# Patient Record
Sex: MALE | Race: WHITE | Employment: FULL TIME | URBAN - METROPOLITAN AREA
[De-identification: names, ages, dates, MRNs, and addresses within clinical notes are randomized per-mention and may not be internally consistent; named-entity substitution may affect disease eponyms.]

---
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
## 2021-06-17 ENCOUNTER — HOSPITAL ENCOUNTER (EMERGENCY)
Age: 21
Discharge: HOME | End: 2021-06-17
Payer: MEDICAID

## 2021-06-17 ENCOUNTER — APPOINTMENT (OUTPATIENT)
Dept: GENERAL RADIOLOGY | Age: 21
End: 2021-06-17
Payer: MEDICAID

## 2021-06-17 VITALS
OXYGEN SATURATION: 100 % | RESPIRATION RATE: 16 BRPM | WEIGHT: 138 LBS | HEIGHT: 68 IN | TEMPERATURE: 98.2 F | HEART RATE: 72 BPM | SYSTOLIC BLOOD PRESSURE: 133 MMHG | DIASTOLIC BLOOD PRESSURE: 57 MMHG | BODY MASS INDEX: 20.92 KG/M2

## 2021-06-17 DIAGNOSIS — M79.641 RIGHT HAND PAIN: Primary | ICD-10-CM

## 2021-06-17 PROCEDURE — 99283 EMERGENCY DEPT VISIT LOW MDM: CPT

## 2021-06-17 PROCEDURE — 73110 X-RAY EXAM OF WRIST: CPT

## 2021-06-17 PROCEDURE — 73130 X-RAY EXAM OF HAND: CPT

## 2021-06-17 RX ORDER — NAPROXEN 500 MG/1
500 TABLET ORAL 2 TIMES DAILY PRN
Qty: 20 TABLET | Refills: 0 | Status: SHIPPED | OUTPATIENT
Start: 2021-06-17 | End: 2022-03-01

## 2021-06-17 ASSESSMENT — PAIN DESCRIPTION - PAIN TYPE: TYPE: ACUTE PAIN

## 2021-06-17 ASSESSMENT — PAIN SCALES - GENERAL: PAINLEVEL_OUTOF10: 2

## 2021-06-17 NOTE — ED PROVIDER NOTES
(MOBIC) 15 MG tablet Take 1 tablet by mouth daily 90 tablet 1    acetaminophen (AMINOFEN) 325 MG tablet Take 2 tablets by mouth every 6 hours as needed for Pain 20 tablet 0    ondansetron (ZOFRAN ODT) 4 MG disintegrating tablet Take 1 tablet by mouth every 8 hours as needed for Nausea 15 tablet 0    albuterol sulfate  (90 Base) MCG/ACT inhaler Inhale 2 puffs into the lungs 4 times daily as needed      budesonide-formoterol (SYMBICORT) 160-4.5 MCG/ACT AERO Inhale 2 puffs into the lungs 2 times daily      tiotropium (SPIRIVA RESPIMAT) 2.5 MCG/ACT AERS inhaler Inhale 2 puffs into the lungs 2 times daily      montelukast (SINGULAIR) 10 MG tablet Take 10 mg by mouth nightly      dupilumab (DUPIXENT) 300 MG/2ML SOSY injection Inject 300 mg into the skin every 14 days      fluticasone-salmeterol (ADVAIR DISKUS) 100-50 MCG/DOSE diskus inhaler Inhale 1 puff into the lungs every 12 hours      predniSONE (DELTASONE) 5 MG tablet Take 5 mg by mouth daily      albuterol (PROVENTIL) (2.5 MG/3ML) 0.083% nebulizer solution Take 3 mLs by nebulization every 4 hours as needed for Wheezing or Shortness of Breath (Cough) 120 each 3       ALLERGIES    Allergies   Allergen Reactions    Asmanex (120 Metered Doses) [Mometasone Furoate]      Patient reports this gives him hot flashes and dizziness.        SOCIAL & FAMILY HISTORY    Social History     Socioeconomic History    Marital status: Single     Spouse name: None    Number of children: None    Years of education: None    Highest education level: None   Occupational History    None   Tobacco Use    Smoking status: Never Smoker    Smokeless tobacco: Never Used   Substance and Sexual Activity    Alcohol use: Never    Drug use: Never    Sexual activity: None   Other Topics Concern    None   Social History Narrative    ** Merged History Encounter **          Social Determinants of Health     Financial Resource Strain:     Difficulty of Paying Living Expenses: no rash. skin intact  Vascular: affected extremity distally neurovascularly intact - sensation and capillary refill intact. Neurologic:  Alert and oriented. Appropriate thought pattern. Equal sensation over the bilateral deltoids and distally. No wrist drop. DTRs and distal sensation equal/intact. 5/5  strength of affected hand    Psychiatric: Cooperative, pleasant affect    RADIOLOGY/PROCEDURES       XR WRIST RIGHT (MIN 3 VIEWS) (Final result)  Result time 06/17/21 09:02:46  Final result by Panfilo Wylie MD (06/17/21 09:02:46)                Impression:    1. No acute abnormality involving the right wrist or hand. Narrative:    EXAMINATION:   3 XRAY VIEWS OF THE RIGHT WRIST; THREE XRAY VIEWS OF THE RIGHT HAND     6/17/2021 8:21 am     COMPARISON:   None. HISTORY:   ORDERING SYSTEM PROVIDED HISTORY: pain   TECHNOLOGIST PROVIDED HISTORY:   Reason for exam:->pain   Reason for Exam: pain     FINDINGS:   The bone mineralization is within normal limits.  The joint spaces appear   unremarkable.  No acute fractures or dislocations are seen. Iris Callander is no soft   tissue swelling. No bony erosions are seen.                     XR HAND RIGHT (MIN 3 VIEWS) (Final result)  Result time 06/17/21 09:02:46  Final result by Panfilo Wylie MD (06/17/21 09:02:46)                Impression:    1. No acute abnormality involving the right wrist or hand. Narrative:    EXAMINATION:   3 XRAY VIEWS OF THE RIGHT WRIST; THREE XRAY VIEWS OF THE RIGHT HAND     6/17/2021 8:21 am     COMPARISON:   None. HISTORY:   ORDERING SYSTEM PROVIDED HISTORY: pain   TECHNOLOGIST PROVIDED HISTORY:   Reason for exam:->pain   Reason for Exam: pain     FINDINGS:   The bone mineralization is within normal limits.  The joint spaces appear   unremarkable.  No acute fractures or dislocations are seen. Iris Callander is no soft   tissue swelling.  No bony erosions are seen.                         ED COURSE & MEDICAL DECISION MAKING Vital signs and nursing notes reviewed during ED course. I have independently evaluated this patient . Supervising MD - Dr. Bala Cardenas - present in the Emergency Department, available for consultation, throughout entirety of  patient care. All pertinent Lab data and radiographic results reviewed with patient at bedside. The patient and/or the family were informed of the results of any tests/labs/imaging, the treatment plan, and time was allotted to answer questions. Differential diagnosis: includes but not limited to ligamentous injury, tendon injury, soft tissue contusion/hematoma, fracture, dislocation, Infection, Neurologic Deficit/Injury. Clinical  IMPRESSION    1. Right hand pain        Patient presents with right thumb and third digit pain after hyperextension injury. Work-up was initiated triage. On exam, no gross bony deformities of the affected digits or hand. Tenderness palpation at the MTP joints of the affected digits without palpable bony or soft tissue defects. Full range of motion of IP and MTP joints with 5/5 strength. Full thumb opposition. No scaphoid tenderness. Patient is neurovascular intact distally with soft compartments throughout. X-rays of the right hand and wrist reveal no evidence of acute osseous abnormality, fracture or subluxation. Given mechanism of injury, suspect acute finger strain versus sprain injury. We discussed consider symptomatic management outpatient follow-up with family doctor as well as orthopedics as cannot completely rule out occult osseous abnormality. As patient is claiming Worker's Comp., will be given outpatient follow-up with occupational health. Low clinical suspicion for ischemic limb, compartment syndrome, intra-articular joint infection/septic arthritis, DVT, osteomyelitis, arterial/neurologic injury, necrotizing fasciitis, or infected/rapidly expanding hematoma. Patient is discharged in stable condition.  Educated on [address] therapy. May continue to be as tolerated. Given naproxen for pain. . Patient is instructed to followup with orthopedics in 7-10 days, sooner with worsened symptoms. Return precautions back to the ED discussed for any new or worsening symptoms. Patient was placed in an ulnar gutter splint today. Pain medication provided. Followup with orthopedist-information provided today. Diagnosis and plan discussed in detail with patient who understands and agrees. Patient agrees to return emergency department if symptoms worsen or any new symptoms develop. Comment: Please note this report has been produced using speech recognition software and may contain errors related to that system including errors in grammar, punctuation, and spelling, as well as words and phrases that may be inappropriate. If there are any questions or concerns please feel free to contact the dictating provider for clarification.           Aaron Mcdaniel PA-C  06/17/21 2750

## 2021-06-17 NOTE — LETTER
[facility] Emergency Department  [address] [facility] [address]  Phone: [phone]  Fax: [phone]             June 17, 2021    Patient: Dayana Brown   YOB: 2000   Date of Visit: 6/17/2021       To Whom It May Concern:    Kermit Cazares was seen and treated in our emergency department on 6/17/2021. He may return to work on 6/18/2021.       Sincerely,     nurse        Signature:__________________________________

## 2021-07-29 ENCOUNTER — OFFICE VISIT (OUTPATIENT)
Dept: CARDIOLOGY CLINIC | Age: 21
End: 2021-07-29
Payer: MEDICAID

## 2021-07-29 ENCOUNTER — NURSE ONLY (OUTPATIENT)
Dept: CARDIOLOGY CLINIC | Age: 21
End: 2021-07-29
Payer: MEDICAID

## 2021-07-29 VITALS
BODY MASS INDEX: 21.31 KG/M2 | DIASTOLIC BLOOD PRESSURE: 74 MMHG | SYSTOLIC BLOOD PRESSURE: 118 MMHG | HEIGHT: 67 IN | WEIGHT: 135.8 LBS | HEART RATE: 55 BPM

## 2021-07-29 DIAGNOSIS — R00.1 BRADYCARDIA: ICD-10-CM

## 2021-07-29 DIAGNOSIS — I10 ESSENTIAL HYPERTENSION: ICD-10-CM

## 2021-07-29 DIAGNOSIS — R00.1 BRADYCARDIA: Primary | ICD-10-CM

## 2021-07-29 DIAGNOSIS — I10 ESSENTIAL HYPERTENSION: Primary | ICD-10-CM

## 2021-07-29 PROCEDURE — G8420 CALC BMI NORM PARAMETERS: HCPCS | Performed by: INTERNAL MEDICINE

## 2021-07-29 PROCEDURE — G8427 DOCREV CUR MEDS BY ELIG CLIN: HCPCS | Performed by: INTERNAL MEDICINE

## 2021-07-29 PROCEDURE — 99204 OFFICE O/P NEW MOD 45 MIN: CPT | Performed by: INTERNAL MEDICINE

## 2021-07-29 PROCEDURE — 93225 XTRNL ECG REC<48 HRS REC: CPT | Performed by: INTERNAL MEDICINE

## 2021-07-29 PROCEDURE — 1036F TOBACCO NON-USER: CPT | Performed by: INTERNAL MEDICINE

## 2021-07-29 PROCEDURE — 93000 ELECTROCARDIOGRAM COMPLETE: CPT | Performed by: INTERNAL MEDICINE

## 2021-07-29 NOTE — PROGRESS NOTES
48 hour holter monitor [email or web address] AM  for Bradycardia  Serial # [id number] . Instructed patient on monitor and proper use. Instructed on diary. When to remove and bring it back. Must leave the holter monitor on  without removing for the duration of time ordered. Answered all questions the patient had. Instructed patient to call [facility] at [phone] with any questions or concerns with the monitor.

## 2021-07-29 NOTE — PROGRESS NOTES
CARDIOLOGY NOTE      7/29/2021    RE: Kristofer Larsen  (2000)                               TO:  Dr. Ortega primary care provider on file. Felipe Lewis is a 22 y.o. male who was seen today for management of  HTN                                    HPI:                   The patient does not have cardiac complaints  But feels has variable heart rate, goes down to 30s , has no CP, has mild SOB. Kristofer Larsen has the following history recorded in care path: There are no problems to display for this patient.     Current Outpatient Medications   Medication Sig Dispense Refill    naproxen (NAPROSYN) 500 MG tablet Take 1 tablet by mouth 2 times daily as needed for Pain 20 tablet 0    acetaminophen (AMINOFEN) 325 MG tablet Take 2 tablets by mouth every 6 hours as needed for Pain 20 tablet 0    ondansetron (ZOFRAN ODT) 4 MG disintegrating tablet Take 1 tablet by mouth every 8 hours as needed for Nausea 15 tablet 0    albuterol sulfate  (90 Base) MCG/ACT inhaler Inhale 2 puffs into the lungs 4 times daily as needed      budesonide-formoterol (SYMBICORT) 160-4.5 MCG/ACT AERO Inhale 2 puffs into the lungs 2 times daily      tiotropium (SPIRIVA RESPIMAT) 2.5 MCG/ACT AERS inhaler Inhale 2 puffs into the lungs 2 times daily      dupilumab (DUPIXENT) 300 MG/2ML SOSY injection Inject 300 mg into the skin every 14 days      fluticasone-salmeterol (ADVAIR DISKUS) 100-50 MCG/DOSE diskus inhaler Inhale 1 puff into the lungs every 12 hours      predniSONE (DELTASONE) 5 MG tablet Take 5 mg by mouth daily      albuterol (PROVENTIL) (2.5 MG/3ML) 0.083% nebulizer solution Take 3 mLs by nebulization every 4 hours as needed for Wheezing or Shortness of Breath (Cough) 120 each 3    baclofen (LIORESAL) 10 MG tablet Take 1 tablet by mouth 3 times daily (Patient not taking: Reported on 7/29/2021) 20 tablet 0    meloxicam (MOBIC) 15 MG tablet Take 1 tablet by mouth daily (Patient not taking: Reported on 7/29/2021) 90 tablet 1    montelukast (SINGULAIR) 10 MG tablet Take 10 mg by mouth nightly (Patient not taking: Reported on 7/29/2021)       No current facility-administered medications for this visit. Allergies: Asmanex (120 metered doses) [mometasone furoate]  Past Medical History:   Diagnosis Date    Asthma      Past Surgical History:   Procedure Laterality Date    KNEE SURGERY Left 2019      As reviewed History reviewed. No pertinent family history. Social History     Tobacco Use    Smoking status: Never Smoker    Smokeless tobacco: Never Used   Substance Use Topics    Alcohol use: Never      Review of Systems:    Constitutional: Negative for diaphoresis and fatigue  Psychological:Negative for anxiety or depression  HENT: Negative for headaches, nasal congestion, sinus pain or vertigo  Eyes: Negative for visual disturbance. Endocrine: Negative for polydipsia/polyuria  Respiratory: Negative for shortness of breath  Cardiovascular: Negative for chest pain, dyspnea on exertion, claudication, edema, irregular heartbeat, murmur, palpitations or shortness of breath  Gastrointestinal: Negative for abdominal pain or heartburn  Genito-Urinary: Negative for urinary frequency/urgency  Musculoskeletal: Negative for muscle pain, muscular weakness, negative for pain in arm and leg or swelling in foot and leg  Neurological: Negative for dizziness, headaches, memory loss, numbness/tingling, visual changes, syncope  Dermatological: Negative for rash    Objective:    Vitals:    07/29/21 0814   BP: 118/74   Pulse: 55   Weight: 135 lb 12.8 oz (61.6 kg)   Height: 5' 7\" (1.702 m)     /74   Pulse 55   Ht 5' 7\" (1.702 m)   Wt 135 lb 12.8 oz (61.6 kg)   BMI 21.27 kg/m²     No flowsheet data found.      Wt Readings from Last 3 Encounters:   07/29/21 135 lb 12.8 oz (61.6 kg)   06/17/21 138 lb (62.6 kg) (22 %, Z= -0.79)*   09/05/20 137 lb (62.1 kg) (23 %, Z= -0.74)*     * Growth percentiles are based on CDC (Boys, 2-20 Years) data. Body mass index is 21.27 kg/m². GENERAL - Alert, oriented, pleasant, in no apparent distress. EYES: No jaundice, no conjunctival pallor. SKIN: It is warm & dry. No rashes. No Echhymosis    HEENT - No clinically significant abnormalities seen. Neck - Supple. No jugular venous distention noted. No carotid bruits. Cardiovascular - Normal S1 and S2 without obvious murmur or gallop. Extremities - No cyanosis, clubbing, or significant edema. Pulmonary - No respiratory distress. No wheezes or rales. Abdomen - No masses, tenderness, or organomegaly. Musculoskeletal - No significant edema. No joint deformities. No muscle wasting. Neurologic - Cranial nerves II through XII are grossly intact. There were no gross focal neurologic abnormalities.     Lab Review   Lab Results   Component Value Date    TROPONINT <0.010 12/03/2019     BNP:  No results found for: BNP  PT/INR:  No results found for: INR  No results found for: LABA1C  Lab Results   Component Value Date    WBC 5.7 08/15/2020    HCT 46.7 08/15/2020    MCV 88.4 08/15/2020     08/15/2020     No results found for: CHOL, TRIG, HDL, LDLCALC, LDLDIRECT, CHOLHDLRATIO  Lab Results   Component Value Date    ALT 24 08/15/2020    AST 19 08/15/2020     BMP:    Lab Results   Component Value Date     08/15/2020    K 4.2 08/15/2020    CL 99 08/15/2020    CO2 26 08/15/2020    BUN 7 08/15/2020    CREATININE 0.8 08/15/2020     CMP:   Lab Results   Component Value Date     08/15/2020    K 4.2 08/15/2020    CL 99 08/15/2020    CO2 26 08/15/2020    BUN 7 08/15/2020    PROT 7.5 08/15/2020     TSH:  No results found for: TSH, TSHHS        Assessment & Plan:    - sohail: holter  - check  ETT for HR and BP        Pavan Talamantes MD    [facility] [place]

## 2021-07-29 NOTE — PATIENT INSTRUCTIONS
**It is YOUR responsibilty to bring medication bottles and/or updated medication list to [address] [place] [address]. This will allow us to better serve you and all your healthcare needs**      Please be informed that if you contact our office outside of normal business hours the physician on call cannot help with any scheduling or rescheduling issues, procedure instruction questions or any type of medication issue. We advise you for any urgent/emergency that you go to the nearest emergency room!     PLEASE CALL OUR OFFICE DURING NORMAL BUSINESS HOURS    Monday - Friday   8 am to 5 pm    [place]: Rachelle 12: [phone]    [place]:  [phone]'

## 2021-08-02 ENCOUNTER — PROCEDURE VISIT (OUTPATIENT)
Dept: CARDIOLOGY CLINIC | Age: 21
End: 2021-08-02
Payer: MEDICAID

## 2021-08-02 DIAGNOSIS — R00.1 BRADYCARDIA: ICD-10-CM

## 2021-08-02 DIAGNOSIS — I10 ESSENTIAL HYPERTENSION: ICD-10-CM

## 2021-08-02 PROCEDURE — 93015 CV STRESS TEST SUPVJ I&R: CPT | Performed by: INTERNAL MEDICINE

## 2021-08-02 NOTE — LETTER
[facility] [place] [facility]  [address] [address]  Phone: [phone]  Fax: [phone]    Gavin Leroy MD, [facility] [place]        August 2, 2021     Patient: Paddy Ramsay   YOB: 2000   Date of Visit: 8/2/2021       To Whom It May Concern:    Maryhelen Bosworth was in my office today for cardiac testing. If you have any questions or concerns, please don't hesitate to call.     Sincerely,        Gavin Leroy MD, [facility] [place]  AM/cjs

## 2021-08-02 NOTE — LETTER
[address] [place] [facility]  [address] [place] [address] [address]  Phone: [phone]  Fax: [phone]    Keli Simmons MD, [facility]      August 2, 2021     Patient: Malcolm Lemos   YOB: 2000   Date of Visit: 8/2/2021       To Whom It May Concern: It is my medical opinion that Alia Parry was in my office today for cardiac testing. If you have any questions or concerns, please don't hesitate to call.     Sincerely,        Keli Simmons MD, [facility]  AM/bandars

## 2021-08-17 PROCEDURE — 93227 XTRNL ECG REC<48 HR R&I: CPT | Performed by: INTERNAL MEDICINE

## 2021-08-18 ENCOUNTER — APPOINTMENT (OUTPATIENT)
Dept: GENERAL RADIOLOGY | Age: 21
End: 2021-08-18
Payer: MEDICAID

## 2021-08-18 ENCOUNTER — TELEPHONE (OUTPATIENT)
Dept: CARDIOLOGY CLINIC | Age: 21
End: 2021-08-18

## 2021-08-18 ENCOUNTER — HOSPITAL ENCOUNTER (EMERGENCY)
Age: 21
Discharge: HOME | End: 2021-08-18
Payer: MEDICAID

## 2021-08-18 VITALS
TEMPERATURE: 98.1 F | HEART RATE: 54 BPM | SYSTOLIC BLOOD PRESSURE: 114 MMHG | OXYGEN SATURATION: 100 % | DIASTOLIC BLOOD PRESSURE: 70 MMHG | RESPIRATION RATE: 17 BRPM | HEIGHT: 67 IN | WEIGHT: 135 LBS | BODY MASS INDEX: 21.19 KG/M2

## 2021-08-18 DIAGNOSIS — R07.89 OTHER CHEST PAIN: Primary | ICD-10-CM

## 2021-08-18 LAB
ALBUMIN SERPL-MCNC: 4.4 GM/DL (ref 3.4–5)
ALP BLD-CCNC: 82 IU/L (ref 40–128)
ALT SERPL-CCNC: 18 U/L (ref 10–40)
ANION GAP SERPL CALCULATED.3IONS-SCNC: 7 MMOL/L (ref 4–16)
AST SERPL-CCNC: 19 IU/L (ref 15–37)
BASOPHILS ABSOLUTE: 0.1 K/CU MM
BASOPHILS RELATIVE PERCENT: 1.1 % (ref 0–1)
BILIRUB SERPL-MCNC: 0.5 MG/DL (ref 0–1)
BUN BLDV-MCNC: 9 MG/DL (ref 6–23)
CALCIUM SERPL-MCNC: 9.7 MG/DL (ref 8.3–10.6)
CHLORIDE BLD-SCNC: 104 MMOL/L (ref 99–110)
CO2: 27 MMOL/L (ref 21–32)
CREAT SERPL-MCNC: 0.8 MG/DL (ref 0.9–1.3)
DIFFERENTIAL TYPE: ABNORMAL
EOSINOPHILS ABSOLUTE: 0.4 K/CU MM
EOSINOPHILS RELATIVE PERCENT: 6.7 % (ref 0–3)
GFR AFRICAN AMERICAN: >60 ML/MIN/1.73M2
GFR NON-AFRICAN AMERICAN: >60 ML/MIN/1.73M2
GLUCOSE BLD-MCNC: 93 MG/DL (ref 70–99)
HCT VFR BLD CALC: 46.8 % (ref 42–52)
HEMOGLOBIN: 14.6 GM/DL (ref 13.5–18)
IMMATURE NEUTROPHIL %: 0.4 % (ref 0–0.43)
LYMPHOCYTES ABSOLUTE: 2 K/CU MM
LYMPHOCYTES RELATIVE PERCENT: 37.4 % (ref 24–44)
MCH RBC QN AUTO: 28.9 PG (ref 27–31)
MCHC RBC AUTO-ENTMCNC: 31.2 % (ref 32–36)
MCV RBC AUTO: 92.5 FL (ref 78–100)
MONOCYTES ABSOLUTE: 0.6 K/CU MM
MONOCYTES RELATIVE PERCENT: 10.5 % (ref 0–4)
NUCLEATED RBC %: 0 %
PDW BLD-RTO: 13.2 % (ref 11.7–14.9)
PLATELET # BLD: 244 K/CU MM (ref 140–440)
PMV BLD AUTO: 11.2 FL (ref 7.5–11.1)
POTASSIUM SERPL-SCNC: 4.6 MMOL/L (ref 3.5–5.1)
RBC # BLD: 5.06 M/CU MM (ref 4.6–6.2)
SEGMENTED NEUTROPHILS ABSOLUTE COUNT: 2.3 K/CU MM
SEGMENTED NEUTROPHILS RELATIVE PERCENT: 43.9 % (ref 36–66)
SODIUM BLD-SCNC: 138 MMOL/L (ref 135–145)
TOTAL IMMATURE NEUTOROPHIL: 0.02 K/CU MM
TOTAL NUCLEATED RBC: 0 K/CU MM
TOTAL PROTEIN: 7.1 GM/DL (ref 6.4–8.2)
TROPONIN T: <0.01 NG/ML
TROPONIN T: <0.01 NG/ML
WBC # BLD: 5.2 K/CU MM (ref 4–10.5)

## 2021-08-18 PROCEDURE — 6370000000 HC RX 637 (ALT 250 FOR IP): Performed by: NURSE PRACTITIONER

## 2021-08-18 PROCEDURE — 93005 ELECTROCARDIOGRAM TRACING: CPT | Performed by: EMERGENCY MEDICINE

## 2021-08-18 PROCEDURE — 80053 COMPREHEN METABOLIC PANEL: CPT

## 2021-08-18 PROCEDURE — 36415 COLL VENOUS BLD VENIPUNCTURE: CPT

## 2021-08-18 PROCEDURE — 84484 ASSAY OF TROPONIN QUANT: CPT

## 2021-08-18 PROCEDURE — 99285 EMERGENCY DEPT VISIT HI MDM: CPT

## 2021-08-18 PROCEDURE — 71046 X-RAY EXAM CHEST 2 VIEWS: CPT

## 2021-08-18 PROCEDURE — 85025 COMPLETE CBC W/AUTO DIFF WBC: CPT

## 2021-08-18 RX ORDER — KETOROLAC TROMETHAMINE 30 MG/ML
15 INJECTION, SOLUTION INTRAMUSCULAR; INTRAVENOUS ONCE
Status: DISCONTINUED | OUTPATIENT
Start: 2021-08-18 | End: 2021-08-18 | Stop reason: HOSPADM

## 2021-08-18 RX ORDER — ASPIRIN 81 MG/1
324 TABLET, CHEWABLE ORAL ONCE
Status: COMPLETED | OUTPATIENT
Start: 2021-08-18 | End: 2021-08-18

## 2021-08-18 RX ADMIN — ASPIRIN 324 MG: 81 TABLET, CHEWABLE ORAL at 12:24

## 2021-08-18 ASSESSMENT — PAIN SCALES - GENERAL: PAINLEVEL_OUTOF10: 3

## 2021-08-18 ASSESSMENT — HEART SCORE: ECG: 1

## 2021-08-18 NOTE — ED PROVIDER NOTES
Twelve-lead EKG obtained at 0958 on 18 August 2021 and interpreted by me in the absence of a cardiologist.  There is nonspecific T wave inversion in lead II compared with prior. There is no criteria ST elevation or reciprocal change. There are no hyperacute T wave changes. There is no sign of acute ischemia or infarction. This tracing shows a sinus bradycardia with sinus arrhythmia. Rate and intervals are 53 beats per minute, MO interval 184 milliseconds, QRS duration 88 milliseconds, QTc interval 358 milliseconds, and R axis right shifted at 92 degrees. There is no acute change compared with the most recent EKG dated July 29, 2021.         Valentin Denis MD  08/18/21 1003

## 2021-08-18 NOTE — ED NOTES
Pt states he does not need pain medicine at this time and is comfortable.       Webster Snellen, TYESHA  08/18/21 9291

## 2021-08-18 NOTE — ED NOTES
[address] [facility] paged Dr Joe Hector covering Dr Leann Zimmerman  08/18/21 7912  1753 repaged Dr Cornelia Geronimo  08/18/21 [phone] Dr Joe Hector returned call      Yaquelin Hartman  08/18/21 1637

## 2021-08-18 NOTE — ED NOTES
Pt presents to ED from home for chest pain and dizziness that has been intermittent for about 3 months. Pt states that he has seen a cardiologist before.  Pt c/o pain in the lower left side of the chest      Amelia Miller RN  08/18/21 9216

## 2021-08-19 LAB
EKG ATRIAL RATE: 53 BPM
EKG DIAGNOSIS: NORMAL
EKG P AXIS: 57 DEGREES
EKG P-R INTERVAL: 184 MS
EKG Q-T INTERVAL: 382 MS
EKG QRS DURATION: 88 MS
EKG QTC CALCULATION (BAZETT): 358 MS
EKG R AXIS: 92 DEGREES
EKG T AXIS: 75 DEGREES
EKG VENTRICULAR RATE: 53 BPM

## 2021-08-19 PROCEDURE — 93010 ELECTROCARDIOGRAM REPORT: CPT | Performed by: INTERNAL MEDICINE

## 2021-08-20 ENCOUNTER — OFFICE VISIT (OUTPATIENT)
Dept: CARDIOLOGY CLINIC | Age: 21
End: 2021-08-20
Payer: MEDICAID

## 2021-08-20 VITALS
DIASTOLIC BLOOD PRESSURE: 74 MMHG | SYSTOLIC BLOOD PRESSURE: 110 MMHG | HEART RATE: 60 BPM | HEIGHT: 67 IN | OXYGEN SATURATION: 99 % | WEIGHT: 135 LBS | BODY MASS INDEX: 21.19 KG/M2

## 2021-08-20 DIAGNOSIS — R07.9 CHEST PAIN, UNSPECIFIED TYPE: Primary | ICD-10-CM

## 2021-08-20 PROCEDURE — G8420 CALC BMI NORM PARAMETERS: HCPCS | Performed by: INTERNAL MEDICINE

## 2021-08-20 PROCEDURE — G8427 DOCREV CUR MEDS BY ELIG CLIN: HCPCS | Performed by: INTERNAL MEDICINE

## 2021-08-20 PROCEDURE — 1036F TOBACCO NON-USER: CPT | Performed by: INTERNAL MEDICINE

## 2021-08-20 PROCEDURE — 99213 OFFICE O/P EST LOW 20 MIN: CPT | Performed by: INTERNAL MEDICINE

## 2021-08-20 PROCEDURE — 1111F DSCHRG MED/CURRENT MED MERGE: CPT | Performed by: INTERNAL MEDICINE

## 2021-08-20 NOTE — LETTER
Brenda Villarreal 29  2000  [id number]    Have you had any Chest Pain that is not new? - Yes  If Yes DO EKG - How does it feel - Sharp Pain and dull ache   How long does the pain last - a couple of seconds before recurring; during [address] visit, pain lasted all day   How long have you been having the pain - 2 years   Did you take a naproxen, aspirin   And did it relieve the pain - No      Have you had any Shortness of Breath - No    Have you had any dizziness - Yes  If Yes DO ORTHOSTATIC BP - when do you feel dizzy with position change   How long does it last - 1 minute or longer (depends)     Have you had any palpitations that are not new?  - No    No edema     Do you have a surgery or procedure scheduled in the near future - No

## 2021-08-20 NOTE — PATIENT INSTRUCTIONS
**It is YOUR responsibilty to bring medication bottles and/or updated medication list to [address] [place] [address]. This will allow us to better serve you and all your healthcare needs**    Please be informed that if you contact our office outside of normal business hours the physician on call cannot help with any scheduling or rescheduling issues, procedure instruction questions or any type of medication issue. We advise you for any urgent/emergency that you go to the nearest emergency room!     PLEASE CALL OUR OFFICE DURING NORMAL BUSINESS HOURS    Monday - Friday   8 am to 5 pm    [place]: [phone]    [place]:  [phone]

## 2021-08-20 NOTE — PROGRESS NOTES
CARDIOLOGY NOTE      8/20/2021    RE: Claudia Baltazar  (2000)                               TO:  Dr. Ortega primary care provider on file.             Zack Hare is a 22 y.o. male who was seen today for management of  HTN        Here for fu on stress and holter  And ED visit fu                            HPI:                   The patient does  have cardiac complaints of lt sided ch pain with mild SOB      Claudia Baltazar has the following history recorded in care path:  Patient Active Problem List    Diagnosis Date Noted    Bradycardia 07/29/2021     Current Outpatient Medications   Medication Sig Dispense Refill    naproxen (NAPROSYN) 500 MG tablet Take 1 tablet by mouth 2 times daily as needed for Pain 20 tablet 0    acetaminophen (AMINOFEN) 325 MG tablet Take 2 tablets by mouth every 6 hours as needed for Pain 20 tablet 0    ondansetron (ZOFRAN ODT) 4 MG disintegrating tablet Take 1 tablet by mouth every 8 hours as needed for Nausea 15 tablet 0    albuterol sulfate  (90 Base) MCG/ACT inhaler Inhale 2 puffs into the lungs 4 times daily as needed      budesonide-formoterol (SYMBICORT) 160-4.5 MCG/ACT AERO Inhale 2 puffs into the lungs 2 times daily      tiotropium (SPIRIVA RESPIMAT) 2.5 MCG/ACT AERS inhaler Inhale 2 puffs into the lungs 2 times daily      dupilumab (DUPIXENT) 300 MG/2ML SOSY injection Inject 300 mg into the skin every 14 days      predniSONE (DELTASONE) 5 MG tablet Take 5 mg by mouth daily      albuterol (PROVENTIL) (2.5 MG/3ML) 0.083% nebulizer solution Take 3 mLs by nebulization every 4 hours as needed for Wheezing or Shortness of Breath (Cough) 120 each 3    baclofen (LIORESAL) 10 MG tablet Take 1 tablet by mouth 3 times daily (Patient not taking: Reported on 7/29/2021) 20 tablet 0    meloxicam (MOBIC) 15 MG tablet Take 1 tablet by mouth daily (Patient not taking: Reported on 7/29/2021) 90 tablet 1    montelukast (SINGULAIR) 10 MG tablet Take 10 mg by mouth nightly (Patient not taking: Reported on 7/29/2021)      fluticasone-salmeterol (ADVAIR DISKUS) 100-50 MCG/DOSE diskus inhaler Inhale 1 puff into the lungs every 12 hours (Patient not taking: Reported on 8/20/2021)       No current facility-administered medications for this visit. Allergies: Albumen, egg and Asmanex (120 metered doses) [mometasone furoate]  Past Medical History:   Diagnosis Date    Asthma      Past Surgical History:   Procedure Laterality Date    KNEE SURGERY Left 2019      As reviewed History reviewed. No pertinent family history. Social History     Tobacco Use    Smoking status: Never Smoker    Smokeless tobacco: Never Used   Substance Use Topics    Alcohol use: Never     Comment: Caffeine: coffee (sometimes), energy drink (here and there), pop       Review of Systems:    Constitutional: Negative for diaphoresis and fatigue  Psychological:Negative for anxiety or depression  HENT: Negative for headaches, nasal congestion, sinus pain or vertigo  Eyes: Negative for visual disturbance. Endocrine: Negative for polydipsia/polyuria  Respiratory: Negative for shortness of breath  Cardiovascular:  CP  Gastrointestinal: Negative for abdominal pain or heartburn  Genito-Urinary: Negative for urinary frequency/urgency  Musculoskeletal: Negative for muscle pain, muscular weakness, negative for pain in arm and leg or swelling in foot and leg  Neurological: Negative for dizziness, headaches, memory loss, numbness/tingling, visual changes, syncope  Dermatological: Negative for rash    Objective:    Vitals:    08/20/21 1244   BP: 110/74   Site: Left Upper Arm   Position: Sitting   Cuff Size: Medium Adult   Pulse: 60   SpO2: 99%   Weight: 135 lb (61.2 kg)   Height: 5' 7\" (1.702 m)     /74 (Site: Left Upper Arm, Position: Sitting, Cuff Size: Medium Adult)   Pulse 60   Ht 5' 7\" (1.702 m)   Wt 135 lb (61.2 kg)   SpO2 99%   BMI 21.14 kg/m²     No flowsheet data found.      Wt Readings from Last 3 Encounters:   08/20/21 135 lb (61.2 kg)   08/18/21 135 lb (61.2 kg)   07/29/21 135 lb 12.8 oz (61.6 kg)     Body mass index is 21.14 kg/m². GENERAL - Alert, oriented, pleasant, in no apparent distress. EYES: No jaundice, no conjunctival pallor. SKIN: It is warm & dry. No rashes. No Echhymosis    HEENT - No clinically significant abnormalities seen. Neck - Supple. No jugular venous distention noted. No carotid bruits. Cardiovascular - Normal S1 and S2 without obvious murmur or gallop. Extremities - No cyanosis, clubbing, or significant edema. Pulmonary - No respiratory distress. No wheezes or rales. Abdomen - No masses, tenderness, or organomegaly. Musculoskeletal - No significant edema. No joint deformities. No muscle wasting. Neurologic - Cranial nerves II through XII are grossly intact. There were no gross focal neurologic abnormalities.     Lab Review   Lab Results   Component Value Date    TROPONINT <0.010 08/18/2021     BNP:  No results found for: BNP  PT/INR:  No results found for: INR  No results found for: LABA1C  Lab Results   Component Value Date    WBC 5.2 08/18/2021    HCT 46.8 08/18/2021    MCV 92.5 08/18/2021     08/18/2021     No results found for: CHOL, TRIG, HDL, LDLCALC, LDLDIRECT, CHOLHDLRATIO  Lab Results   Component Value Date    ALT 18 08/18/2021    AST 19 08/18/2021     BMP:    Lab Results   Component Value Date     08/18/2021    K 4.6 08/18/2021     08/18/2021    CO2 27 08/18/2021    BUN 9 08/18/2021    CREATININE 0.8 08/18/2021     CMP:   Lab Results   Component Value Date     08/18/2021    K 4.6 08/18/2021     08/18/2021    CO2 27 08/18/2021    BUN 9 08/18/2021    PROT 7.1 08/18/2021     TSH:  No results found for: TSH, TSHHS        Assessment & Plan:    - sohail: holter  Was normal  -Normal ETT  But cont to have CP, check CTA coronary      Alexander Davis MD    [facility] [place]

## 2021-08-20 NOTE — LETTER
[facility]  [address] [address]  Phone: [phone]  Fax: [phone]    Eloy Cervantes MD        August 20, 2021     Patient: Claudia Baltazar   YOB: 2000   Date of Visit: 8/20/2021       To Whom it May Concern:    Alex Kinney was seen in my clinic on 8/20/2021. If you have any questions or concerns, please don't hesitate to call.     Sincerely,         Eloy Cervantes MD

## 2021-08-30 ENCOUNTER — TELEPHONE (OUTPATIENT)
Dept: CARDIOLOGY CLINIC | Age: 21
End: 2021-08-30

## 2022-03-01 ENCOUNTER — OFFICE VISIT (OUTPATIENT)
Dept: CARDIOLOGY CLINIC | Age: 22
End: 2022-03-01
Payer: MEDICAID

## 2022-03-01 VITALS
WEIGHT: 131 LBS | BODY MASS INDEX: 20.56 KG/M2 | HEART RATE: 65 BPM | SYSTOLIC BLOOD PRESSURE: 112 MMHG | OXYGEN SATURATION: 99 % | HEIGHT: 67 IN | DIASTOLIC BLOOD PRESSURE: 64 MMHG

## 2022-03-01 DIAGNOSIS — I20.8 OTHER FORMS OF ANGINA PECTORIS (HCC): Primary | ICD-10-CM

## 2022-03-01 PROCEDURE — G8484 FLU IMMUNIZE NO ADMIN: HCPCS | Performed by: INTERNAL MEDICINE

## 2022-03-01 PROCEDURE — 99214 OFFICE O/P EST MOD 30 MIN: CPT | Performed by: INTERNAL MEDICINE

## 2022-03-01 PROCEDURE — G8420 CALC BMI NORM PARAMETERS: HCPCS | Performed by: INTERNAL MEDICINE

## 2022-03-01 PROCEDURE — 1036F TOBACCO NON-USER: CPT | Performed by: INTERNAL MEDICINE

## 2022-03-01 PROCEDURE — G8427 DOCREV CUR MEDS BY ELIG CLIN: HCPCS | Performed by: INTERNAL MEDICINE

## 2022-03-01 NOTE — PROGRESS NOTES
CARDIOLOGY NOTE      3/1/2022    RE: Becky Vo  (2000)                               TO:  Dr. Ortega primary care provider on file. Zhou Mcadams is a 22 y.o. male who was seen today for management of chest pain                                    HPI:                   Pt has h/o chest pain, seen today for follow-up. Pt has been having midsternal chest pain on and off nonexertional at times he was scheduled to get a CTA coronary however could not be performed given that his insurance is status is changed    [place] Tavo has the following history recorded in care path:  Patient Active Problem List    Diagnosis Date Noted    Bradycardia 07/29/2021     Current Outpatient Medications   Medication Sig Dispense Refill    naproxen (NAPROSYN) 500 MG tablet Take 1 tablet by mouth 2 times daily as needed for Pain 20 tablet 0    acetaminophen (AMINOFEN) 325 MG tablet Take 2 tablets by mouth every 6 hours as needed for Pain 20 tablet 0    ondansetron (ZOFRAN ODT) 4 MG disintegrating tablet Take 1 tablet by mouth every 8 hours as needed for Nausea 15 tablet 0    albuterol sulfate  (90 Base) MCG/ACT inhaler Inhale 2 puffs into the lungs 4 times daily as needed      budesonide-formoterol (SYMBICORT) 160-4.5 MCG/ACT AERO Inhale 2 puffs into the lungs 2 times daily      tiotropium (SPIRIVA RESPIMAT) 2.5 MCG/ACT AERS inhaler Inhale 2 puffs into the lungs 2 times daily      montelukast (SINGULAIR) 10 MG tablet Take 10 mg by mouth nightly       predniSONE (DELTASONE) 5 MG tablet Take 5 mg by mouth daily      albuterol (PROVENTIL) (2.5 MG/3ML) 0.083% nebulizer solution Take 3 mLs by nebulization every 4 hours as needed for Wheezing or Shortness of Breath (Cough) 120 each 3     No current facility-administered medications for this visit. Allergies:  Albumen, egg and Asmanex (120 metered doses) [mometasone furoate]  Past Medical History:   Diagnosis Date    Asthma      Past Surgical History:   Procedure Laterality Date    KNEE SURGERY Left 2019      As reviewed History reviewed. No pertinent family history. Social History     Tobacco Use    Smoking status: Never Smoker    Smokeless tobacco: Never Used   Substance Use Topics    Alcohol use: Never     Comment: Caffeine: coffee (sometimes), energy drink (here and there), pop         Objective:    Vitals:    03/01/22 0805 03/01/22 0812   BP: (!) 112/54 112/64   Site: Right Upper Arm Right Upper Arm   Position: Sitting Sitting   Cuff Size: Medium Adult Medium Adult   Pulse: 65    SpO2: 99%    Weight: 131 lb (59.4 kg)    Height: 5' 7\" (1.702 m)      /64 (Site: Right Upper Arm, Position: Sitting, Cuff Size: Medium Adult)   Pulse 65   Ht 5' 7\" (1.702 m)   Wt 131 lb (59.4 kg)   SpO2 99%   BMI 20.52 kg/m²     No flowsheet data found. Wt Readings from Last 3 Encounters:   03/01/22 131 lb (59.4 kg)   08/20/21 135 lb (61.2 kg)   08/18/21 135 lb (61.2 kg)     Body mass index is 20.52 kg/m². GENERAL - Alert, oriented, pleasant, in no apparent distress. EYES: No jaundice, no conjunctival pallor. SKIN: It is warm & dry. No rashes. No Echhymosis    HEENT - No clinically significant abnormalities seen. Neck - Supple. No jugular venous distention noted. No carotid bruits. Cardiovascular - Normal S1 and S2 without obvious murmur or gallop. Extremities - No cyanosis, clubbing, or significant edema. Pulmonary - No respiratory distress. No wheezes or rales. Abdomen - No masses, tenderness, or organomegaly. Musculoskeletal - No significant edema. No joint deformities. No muscle wasting. Neurologic - Cranial nerves II through XII are grossly intact. There were no gross focal neurologic abnormalities.     Lab Review   Lab Results   Component Value Date    TROPONINT <0.010 08/18/2021     BNP:  No results found for: BNP  PT/INR:  No results found for: INR  No results found for: LABA1C  Lab Results   Component Value Date WBC 5.2 08/18/2021    HCT 46.8 08/18/2021    MCV 92.5 08/18/2021     08/18/2021     No results found for: CHOL, TRIG, HDL, LDLCALC, LDLDIRECT, CHOLHDLRATIO  Lab Results   Component Value Date    ALT 18 08/18/2021    AST 19 08/18/2021     BMP:    Lab Results   Component Value Date     08/18/2021    K 4.6 08/18/2021     08/18/2021    CO2 27 08/18/2021    BUN 9 08/18/2021    CREATININE 0.8 08/18/2021     CMP:   Lab Results   Component Value Date     08/18/2021    K 4.6 08/18/2021     08/18/2021    CO2 27 08/18/2021    BUN 9 08/18/2021    PROT 7.1 08/18/2021     TSH:  No results found for: TSH, TSHHS        Assessment & Plan:    -Chest pain; cardiac CTA requested on the last visit is still pending  Still having symptoms hence will obtain a CTA coronary on him patient's insurance is changed hence we will try to get it done  Please note the patient regular treadmill stress test was normal however he continues to be symptomatic and has a very strong family history of coronary disease with his grandfather who is my patient has coronary artery disease and grandmother also     -Patient had a normal stress,  and Holter monitor    -Risk factor modification for this patient patient will continue        Jeff Douglas MD    [facility] [place]    Please note this report has been partially produced using speech recognition software and may contain errors related to that system including errors in grammar, punctuation, and spelling, as well as words and phrases that may be inappropriate. If there are any questions or concerns please feel free to contact the dictating provider for clarification.

## 2022-03-01 NOTE — LETTER
[facility]  [address] [place] [address]  Phone: [phone]  Fax: [phone]    Anam Huber MD        March 1, 2022     Patient: Lyudmila May   YOB: 2000   Date of Visit: 3/1/2022       To Whom It May Concern: It is my medical opinion that Marialuisa Gordon may return to full duty immediately with no restrictions. If you have any questions or concerns, please don't hesitate to call.     Sincerely,        Anam Huber MD

## 2022-03-01 NOTE — PATIENT INSTRUCTIONS
Please be informed that if you contact our office outside of normal business hours the physician on call cannot help with any scheduling or rescheduling issues, procedure instruction questions or any type of medication issue. We advise you for any urgent/emergency that you go to the nearest emergency room! PLEASE CALL OUR OFFICE DURING NORMAL BUSINESS HOURS    Monday - Friday   8 am to 5 pm    [place]: Rachelle 12: [phone]    [place]:  [phone]    **It is YOUR responsibilty to bring medication bottles and/or updated medication list to [address] [place] [address].  This will allow us to better serve you and all your healthcare needs**

## 2022-03-30 ENCOUNTER — HOSPITAL ENCOUNTER (OUTPATIENT)
Dept: CT IMAGING | Age: 22
Discharge: HOME | End: 2022-03-30
Payer: COMMERCIAL

## 2022-03-30 DIAGNOSIS — I20.8 OTHER FORMS OF ANGINA PECTORIS (HCC): ICD-10-CM

## 2022-03-30 PROCEDURE — 75574 CT ANGIO HRT W/3D IMAGE: CPT

## 2022-03-30 PROCEDURE — 75574 CT ANGIO HRT W/3D IMAGE: CPT | Performed by: INTERNAL MEDICINE

## 2022-03-30 PROCEDURE — 6360000004 HC RX CONTRAST MEDICATION: Performed by: INTERNAL MEDICINE

## 2022-03-30 RX ADMIN — IOPAMIDOL 120 ML: 755 INJECTION, SOLUTION INTRAVENOUS at 09:00

## 2022-04-05 NOTE — PROCEDURES
CARDIAC FINDINGS:  Cardiac CT scanning was performed, CT angiography during contrast administration using a 64. Scanner using a slice thickness of 0.5 mm and Gantry rotation time of 600 ms. Scanning was performed using 110 K  using auto adjustment of mA  Adjustment achieved low dose, retrospective gating was used  Contrast Type: isovue 370   Contrast used: 1cc/lb Max 100 cc   Dose length Product use: DLP 1157.02   Scan length: Excellent    Left Main Coronary artery:  Left main originates normally from the left coronary sinus and gives rise to the left anterior descending artery and left circumflex artery. It is free of any significant atherosclerotic disease. Left anterior descending artery:  Left anterior descending artery is a normal caliber vessel that wraps around the apex and gives off 2 diagonal branches. The diagonal branches are free of any significant atherosclerotic disease. Left circumflex artery:  Left circumflex artery is a moderate size vessel that gives off a marginal branch. The left circumflex artery is free of any significant atherosclerotic disease. Right coronary artery:  Right coronary artery originates from the right coronary sinus. It seems to be the dominant vessel and gives rise to the posterior descending artery and posterior ventricular artery. The RCA is moderate size vessel. Marnell Records Posterior descending artery and posterior lateral branches are free of any significant disease. Pericardium:  Pericardium is normal in appearance without any thickening, calcification or pericardial fluid. Cardiac structures: The estimated ejection fraction is: 69% . The ascending aorta is measured at  22mm    Interpretation summary:  There is no significant coronary artery disease identified. Normal ejection fraction 69%  Myocardium does not show any evidence of infarct or thickening.     NON-CARDIAC FINDINGS - []      Please note that only cardiac images and windows were evaluated by cardiology. Images reviewed for interpretation for cardiac structures and coronary artery disease only.   Any non cardiac assessment  require separate radiology report

## 2022-04-07 ENCOUNTER — TELEPHONE (OUTPATIENT)
Dept: CARDIOLOGY CLINIC | Age: 22
End: 2022-04-07

## 2022-06-28 ENCOUNTER — HOSPITAL ENCOUNTER (OUTPATIENT)
Dept: ULTRASOUND IMAGING | Age: 22
Discharge: HOME | End: 2022-06-28
Payer: COMMERCIAL

## 2022-06-28 ENCOUNTER — HOSPITAL ENCOUNTER (OUTPATIENT)
Dept: NUCLEAR MEDICINE | Age: 22
Discharge: HOME | End: 2022-06-28
Payer: COMMERCIAL

## 2022-06-28 DIAGNOSIS — R63.4 LOSS OF WEIGHT: ICD-10-CM

## 2022-06-28 DIAGNOSIS — R11.0 NAUSEA: ICD-10-CM

## 2022-06-28 PROCEDURE — 3430000000 HC RX DIAGNOSTIC RADIOPHARMACEUTICAL: Performed by: INTERNAL MEDICINE

## 2022-06-28 PROCEDURE — 76705 ECHO EXAM OF ABDOMEN: CPT

## 2022-06-28 PROCEDURE — 78227 HEPATOBIL SYST IMAGE W/DRUG: CPT

## 2022-06-28 PROCEDURE — A9537 TC99M MEBROFENIN: HCPCS | Performed by: INTERNAL MEDICINE

## 2022-06-28 RX ADMIN — Medication 5 MILLICURIE: at 08:40

## 2022-11-16 ENCOUNTER — HOSPITAL ENCOUNTER (OUTPATIENT)
Dept: WOUND CARE | Age: 22
Discharge: HOME | End: 2022-11-16
Payer: COMMERCIAL

## 2022-11-16 VITALS
TEMPERATURE: 98.9 F | DIASTOLIC BLOOD PRESSURE: 64 MMHG | RESPIRATION RATE: 16 BRPM | SYSTOLIC BLOOD PRESSURE: 110 MMHG | HEART RATE: 54 BPM

## 2022-11-16 DIAGNOSIS — S68.612A COMPLETE TRAUMATIC AMPUTATION OF RIGHT MIDDLE FINGER THROUGH PHALANX, INITIAL ENCOUNTER: ICD-10-CM

## 2022-11-16 PROCEDURE — 11042 DBRDMT SUBQ TIS 1ST 20SQCM/<: CPT | Performed by: NURSE PRACTITIONER

## 2022-11-16 PROCEDURE — 99213 OFFICE O/P EST LOW 20 MIN: CPT

## 2022-11-16 PROCEDURE — 99203 OFFICE O/P NEW LOW 30 MIN: CPT | Performed by: NURSE PRACTITIONER

## 2022-11-16 PROCEDURE — 11042 DBRDMT SUBQ TIS 1ST 20SQCM/<: CPT

## 2022-11-16 NOTE — LETTER
[facility]  [address] [address]  Phone: [phone]    Kathy Jovel CNP      November 16, 2022     Patient: Bandar Jung   YOB: 2000   Date of Visit: 11/16/2022       To Whom It May Concern:     Berkley Manzanares was seen in the [address] [place] [address],  [address] on Wednesday 11/16/22. If you have any questions or concerns, please don't hesitate to call.     Sincerely,        Kathy Jovel CNP

## 2022-11-16 NOTE — DISCHARGE INSTRUCTIONS
PHYSICIAN ORDERS AND DISCHARGE INSTRUCTIONS    Wound cleansing:     Do not scrub or use excessive force. Wash hands with soap and water before and after dressing changes. Prior to applying a clean dressing, cleanse wound with normal saline,               wound cleanser, or mild soap and water. Ask the physician or nurse before getting the wound(s) wet in a shower    Daily Wound management:   Keep weight off wounds and reposition every 2 hours. Avoid standing for long periods of time. Apply wraps/stockings in AM and remove at bedtime. If swelling is present, elevate legs to the level of the heart or above for              30 minutes 4-5 times a day and/or when sitting. When taking antibiotics take entire prescription as ordered by              physician do not stop taking until medicine is all gone. Grafts:               Wound Care Notes:                    Orders for this week:22       Left Hand Wounds:Wash with soap and water. Pat dry. Apply  anasept and stimulen  to wound bed  Cover with small gentac border  Wrap with 1 inch coban    Change daily      Follow Up Instructions: At the [address] [place] [address] in 1 week   Primary Wound Care Provider: Dr Mario Cinnamon   Call  for any questions or concerns.   Central [phone] for imaging lab work

## 2022-11-17 PROBLEM — S68.612A: Status: ACTIVE | Noted: 2022-11-17

## 2022-11-17 RX ORDER — LIDOCAINE HYDROCHLORIDE 40 MG/ML
SOLUTION TOPICAL ONCE
Status: CANCELLED | OUTPATIENT
Start: 2022-11-17 | End: 2022-11-17

## 2022-11-17 RX ORDER — GINSENG 100 MG
CAPSULE ORAL ONCE
Status: CANCELLED | OUTPATIENT
Start: 2022-11-17 | End: 2022-11-17

## 2022-11-17 RX ORDER — LIDOCAINE 50 MG/G
OINTMENT TOPICAL ONCE
Status: CANCELLED | OUTPATIENT
Start: 2022-11-17 | End: 2022-11-17

## 2022-11-17 RX ORDER — LIDOCAINE 40 MG/G
CREAM TOPICAL ONCE
Status: CANCELLED | OUTPATIENT
Start: 2022-11-17 | End: 2022-11-17

## 2022-11-17 RX ORDER — BACITRACIN, NEOMYCIN, POLYMYXIN B 400; 3.5; 5 [USP'U]/G; MG/G; [USP'U]/G
OINTMENT TOPICAL ONCE
Status: CANCELLED | OUTPATIENT
Start: 2022-11-17 | End: 2022-11-17

## 2022-11-17 RX ORDER — BACITRACIN ZINC AND POLYMYXIN B SULFATE 500; 1000 [USP'U]/G; [USP'U]/G
OINTMENT TOPICAL ONCE
Status: CANCELLED | OUTPATIENT
Start: 2022-11-17 | End: 2022-11-17

## 2022-11-17 RX ORDER — GENTAMICIN SULFATE 1 MG/G
OINTMENT TOPICAL ONCE
Status: CANCELLED | OUTPATIENT
Start: 2022-11-17 | End: 2022-11-17

## 2022-11-17 NOTE — PROGRESS NOTES
[facility] Initial Visit      Annie Jones  AGE: 25 y.o. GENDER: male  : 2000  EPISODE DATE:  2022   Referred by:Dr. Vanna Veliz    Subjective:     CHIEF COMPLAINT  WOUND LEFT MIDDLE FINGER  Chief Complaint   Patient presents with    Wound Check        HISTORY of PRESENT ILLNESS      Annie Jones is a 25 y.o. 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 The condition is of mild severity. The ulcer has been present since 10/6/22. The underlying cause is thought to be traumatic work related crushing injury from a 50 pound door falling on his finger. The patients care to date has included evaluation and treatment at [facility] with left long finger tip amputation with care per Dr. Vanna Veliz with antibiotics and referral to the wound clinic. The patient has significant underlying medical conditions as below. Wound Pain Timing/Severity: intermittent, mild  Quality of pain: dull, aching, tender  Severity of pain:  2 / 10   Modifying Factors:  vaping  Associated Signs/Symptoms: edema, drainage, and pain    MARY: as indicated base on wound location and assessment    Wound infection: wound culture will be obtained as needed for symptoms of infection. No apparent local or systemic S&S infection noted. Arterial evaluation: if indicated based on wound, location, symptoms and healing    Venous Evaluation: if indicated based on wound, location, symptoms and healing    Diabetes: No  Smoking: No, but is vaping. Patient counseled in length on vaping cessation including benefits of quitting and health risks of continuing to vape including but not limited to lung and heart damage. Patient verbalized understanding today, is not ready to quit.    Anticoagulant/Antiplatelet therapy: No  Immunosuppression: No  Obesity:No    Patient educated on the 6 essential components necessary for wound healing: Circulation, Debridements, Proper Dressings and Topical Wound Products, Infection Control, Edema Control and Offloading. Patient educated on those factors that negatively effect or impact wound healing: smoking, obesity, uncontrolled diabetes, anticoagulant and immunosuppressive regimens, inadequate nutrition, untreated arterial and venous disease if applicable and measures to manage edema. Nutritional status: well nourished. Discussed need for increased protein and calories for wound healing and good sources of protein (just over 7 grams for every 20 pounds of body weight). Animal-based foods high in protein (meat, poultry, fish, eggs, and dairy foods). Plant based foods high in protein (tofu, lentils, beans, chickpeas, nuts, quinoa and denita seeds. Off Loading  Offloading or minimizing or removing weight placed on an area with poor circulation such as diabetic wounds or pressure. This can be achieved with crutches, wheel chair, knee walker etc. Minimizing pressure through partial weight bearing (minimizing the amount of  pressure applied and or the amount of time on the area of pressure) or maintaining a non-weight bearing status can be used to promote and often can be essential for thee wound to heal. Off loading may also need to be achieved for non-weight bearing wounds such as pressure ulcers to the torso. Turning and changing positions frequently, at least every two hours. Use of pressure cushion if sitting up in chair. Skin Care  Keep skin clean and well moisturized , moisturize routinely with ointments for heavier moisturizer needs for extremely dry skin or cracks such as A&D ointment and lotions for a light moisturizer such as CeraVe or Eucerin. If incontinent change incontinence garments as soon as soiled and keeping skin clean and use barrier cream to protect the skin.     Reduce Salt and Sodium  Choose low- or reduced- sodium, or no-salt-added versions of foods and condiments when available. Buy fresh, plain frozen, or canned with no-added-salt vegetables. Use fresh poultry, fish and lean meat, rather than canned, smoked or processed types. Choose ready-to-eat breakfast cereals that are lower in sodium. Limit cured foods (such as armando and ham), foods packed in brine (such as pickled foods) and condiments (such as MSG, mustard, horseradish, and catsup). Limit even lower sodium versions of soy sauce and teriyaki sauce-treat these condiments just like salt). In cooking and at the table, flavor foods with herbs, spices, lemon, lime, vinegar or salt-free seasoning blends. Start by cutting salt in half. Cook rice, pasta and hot cereals without salt. Cut back on instant or flavored rice, pasta and cereal mixes, which usually have added salt. Choose convenience foods that are lower in sodium. Limit frozen dinners, packaged mixes, canned soups and dressings. Rinse canned foods, such as tuna, to remove some sodium. Choose fruits or vegetables instead of salty snack foods. Edema Management   Whenever resting, raise your legs up. Try to keep the swollen area higher than the level of your heart. Take breaks from standing or sitting in one position. Walk around to increase the blood flow in your lower legs. Move your feet and ankles often while you stand, or tighten and relax your leg muscles. Wear support stockings. Put them on in the morning, before swelling gets worse. Eat a balanced diet. Lose weight if you need to. Limit the amount of salt (sodium) in your diet. Salt holds fluid in the body and may increase swelling. Apply compression stocking(s) every morning as soon as you get up. Remove at bedtime unless instructed to wear day and night. Hand wash and line dry to prevent loss of elasticity. Replace every 3-4 months to ensure proper fit. Weight Management   Will need to ultimately change overall eating behaviors to have success with weight loss.  Encouraged to weigh daily and work towards a goal of 1-2 pounds of weight loss weekly. Encouraged to journal all food intake, myfitness pal is a useful tool to help keep track of food intake and caloric value. Keep calorie level at approximately 9219-5703. Protein intake is to be a minimum of 60 grams per day (unless otherwise directed). Water drinking was encouraged with a goal of 64oz-128oz daily. Beverages to be calorie free except for milk. Every other beverage should be water, avoid soda. Continue to increase level of physical activity. Refer to weight management as indicated and requested by patient. PAST MEDICAL HISTORY        Diagnosis Date    Asthma        PAST SURGICAL HISTORY    Past Surgical History:   Procedure Laterality Date    KNEE SURGERY Left 2019       FAMILY HISTORY    History reviewed. No pertinent family history. SOCIAL HISTORY    Social History     Tobacco Use    Smoking status: Never    Smokeless tobacco: Never    Tobacco comments:     Encouraged to stop vapping   Vaping Use    Vaping Use: Every day   Substance Use Topics    Alcohol use: Never     Comment: Caffeine: coffee (sometimes), energy drink (here and there), pop     Drug use: Never       ALLERGIES    Allergies   Allergen Reactions    Albumen, Egg Itching    Asmanex (120 Metered Doses) [Mometasone Furoate]      Patient reports this gives him hot flashes and dizziness.        MEDICATIONS    Current Outpatient Medications on File Prior to Encounter   Medication Sig Dispense Refill    naproxen (NAPROSYN) 500 MG tablet Take 1 tablet by mouth 2 times daily as needed for Pain 20 tablet 0    acetaminophen (AMINOFEN) 325 MG tablet Take 2 tablets by mouth every 6 hours as needed for Pain 20 tablet 0    albuterol sulfate  (90 Base) MCG/ACT inhaler Inhale 2 puffs into the lungs 4 times daily as needed      budesonide-formoterol (SYMBICORT) 160-4.5 MCG/ACT AERO Inhale 2 puffs into the lungs 2 times daily      tiotropium (SPIRIVA RESPIMAT) 2.5 MCG/ACT AERS inhaler Inhale 2 puffs into the lungs 2 times daily      albuterol (PROVENTIL) (2.5 MG/3ML) 0.083% nebulizer solution Take 3 mLs by nebulization every 4 hours as needed for Wheezing or Shortness of Breath (Cough) 120 each 3     No current facility-administered medications on file prior to encounter.        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       REVIEW OF SYSTEMS      Constitutional: negative for anorexia, chills, fatigue, fevers, malaise, and sweats  Respiratory: negative for cough, hemoptysis, pleurisy/chest pain, sputum, and stridor  Cardiovascular: negative for chest pain, chest pressure/discomfort, exertional chest pressure/discomfort, near-syncope, orthopnea, palpitations, paroxysmal nocturnal dyspnea, syncope, and tachypnea  Integument/breast: positive for skin lesion(s)       Objective:      /64   Pulse 54   Temp 98.9 °F (37.2 °C) (Temporal)   Resp 16     BP Readings from Last 3 Encounters:   11/16/22 110/64   03/01/22 112/64   08/20/21 110/74        PHYSICAL EXAM  General Appearance:   Alert, cooperative, no distress   Head:   Normocephalic, without obvious abnormality, atraumatic    Eyes:   PERRL, conjunctiva/corneas clear    Ears:   Normal external appearance, hearing grossly intact    Nose:  Nares normal, mucosa normal, no drainage    Throat:  Lips, mucosa, and tongue normal    Neck:  Supple, trachea midline    Respiratory:   Equal chest rise, respirations unlabored, no wheezing   Cardiovascular:   Regular rate and rhythm   Abdomen, GI:   Soft, non-tender, no rebound or guarding    Musculoskeletal:  Atraumatic, no cyanosis or edema    Neurologic:  Nonfocal, strength & sensation grossly intact   Psychiatric: Oriented x3, grossly appropriate judgement and insight      Dermatologic exam: Visual inspection of the periwound reveals the skin to be edematous  Wound exam: see wound description below in procedure note      Assessment:       Leonardo Medina  appears to have a non-healing wound of the left middle finger. The etiology of the wound is felt to be traumatic/non-healing surgical. There are multiple complicating factors including  vaping . A comprehensive wound management program would be helpful to heal this wound. Assessments completed include fall risk and nutritional, functional,and psychological status. At this time appropriate care would include: periodic debridement and wound care as below. 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 Performed by: SUSAN Gallardo CNP    Consent obtained: Yes    Time out taken:  Yes    Pain Control: Anesthetic  Anesthetic: 4% Lidocaine Liquid Topical        Debridement:Excisional Debridement    Using curette and tissue nippers the wound(s) was/were sharply debrided down through and including the removal of subcutaneous tissue. Devitalized Tissue Debrided:  fibrin, biofilm, slough, necrotic/eschar, and exudate    Pre Debridement Measurements:  Are located in the Wound Documentation Flow Sheet    All active wounds listed below with today's date are evaluated  Wound(s)    debrided this date include # : 1     Post  Debridement Measurements:  Wound 11/16/22 Finger (Comment which one) Anterior; Left #1 3rd finger (Active)   Wound Image   11/16/22 0901   Wound Etiology Traumatic 11/16/22 0919   Dressing Status New dressing applied 11/16/22 0924   Wound Cleansed Wound cleanser 11/16/22 0901   Offloading for Diabetic Foot Ulcers Offloading not required 11/16/22 0924   Wound Length (cm) 0 cm 11/16/22 0901   Wound Width (cm) 0 cm 11/16/22 0901   Wound Depth (cm) 0 cm 11/16/22 0901   Wound Surface Area (cm^2) 0 cm^2 11/16/22 0901   Wound Volume (cm^3) 0 cm^3 11/16/22 0901   Post-Procedure Length (cm) 0.2 cm 11/16/22 0919   Post-Procedure Width (cm) 0.5 cm 11/16/22 0919   Post-Procedure Depth (cm) 0.1 cm 11/16/22 0919   Post-Procedure Surface Area (cm^2) 0.1 cm^2 11/16/22 0919   Post-Procedure Volume (cm^3) 0.01 cm^3 11/16/22 0919   Number of days: 1       Percent of Wound(s) Debrided: approximately 100%    Total  Area  Debrided: 0.1 sq cm     Bleeding:  Minimal    Hemostasis Achieved:  by pressure    Procedural Pain:  0  / 10     Post Procedural Pain:  0 / 10     Response to treatment:  Well tolerated by patient. Status of wound: The wound was debrided today, no S&S of local or systemic infection. Regimen as below, follow up next week. The patients records were reviewed and discussed. Time was given for questions . All questions were answered to the patients satisfaction. A total time of 30 minutes was spent with at least 50% related to face to face counseling and coordination of care. Plan:     Discharge instructions:    Discharge Instructions         PHYSICIAN ORDERS AND DISCHARGE INSTRUCTIONS    Wound cleansing:     Do not scrub or use excessive force. Wash hands with soap and water before and after dressing changes. Prior to applying a clean dressing, cleanse wound with normal saline,               wound cleanser, or mild soap and water. Ask the physician or nurse before getting the wound(s) wet in a shower    Daily Wound management:   Keep weight off wounds and reposition every 2 hours. Avoid standing for long periods of time. Apply wraps/stockings in AM and remove at bedtime. If swelling is present, elevate legs to the level of the heart or above for              30 minutes 4-5 times a day and/or when sitting.       When taking antibiotics take entire prescription as ordered by              physician do not stop taking until medicine is all gone. Grafts:               Wound Care Notes:                    Orders for this week:22       Left Hand Wounds:Wash with soap and water. Pat dry. Apply  anasept and stimulen  to wound bed  Cover with small gentac border  Wrap with 1 inch coban    Change daily      Follow Up Instructions: At the [address] [place] [address] in 1 week   Primary Wound Care Provider: Dr Amira Pires   Call  for any questions or concerns. Central [phone] for imaging lab work          Treatment Note Wound 22 Finger (Comment which one) Anterior; Left #1 3rd finger-Dressing/Treatment:  (anasept stimulen gentac border coban)    Written Patient Dismissal Instructions Given            Electronically signed by SUSAN Lowery CNP on 2022 at 11:52 AM

## 2022-11-22 NOTE — DISCHARGE INSTRUCTIONS
PHYSICIAN ORDERS AND DISCHARGE INSTRUCTIONS     Wound cleansing:              Do not scrub or use excessive force. Wash hands with soap and water before and after dressing changes. Prior to applying a clean dressing, cleanse wound with normal saline,               wound cleanser, or mild soap and water. Ask the physician or nurse before getting the wound(s) wet in a shower     Daily Wound management:             Keep weight off wounds and reposition every 2 hours. Avoid standing for long periods of time. Apply wraps/stockings in AM and remove at bedtime. If swelling is present, elevate legs to the level of the heart or above for              30 minutes 4-5 times a day and/or when sitting. When taking antibiotics take entire prescription as ordered by              physician do not stop taking until medicine is all gone. Grafts:                Wound Care Notes:                                        Orders for this week:22                 Left Hand Wounds:Wash with soap and water. Pat dry. Apply  anasept and stimulen  to wound bed  Cover with xeroform and small gentac border  Wrap with 1 inch coban     Change daily        Follow Up Instructions: At the [address] in 1 week   Primary Wound Care Provider: Dr Toshia Nugent   Call  for any questions or concerns.   Central [phone] for imaging lab work

## 2022-11-23 ENCOUNTER — HOSPITAL ENCOUNTER (OUTPATIENT)
Dept: WOUND CARE | Age: 22
Discharge: HOME | End: 2022-11-23
Payer: COMMERCIAL

## 2022-11-23 VITALS
HEART RATE: 63 BPM | RESPIRATION RATE: 16 BRPM | DIASTOLIC BLOOD PRESSURE: 67 MMHG | TEMPERATURE: 99.9 F | SYSTOLIC BLOOD PRESSURE: 105 MMHG

## 2022-11-23 DIAGNOSIS — T81.89XA NON-HEALING SURGICAL WOUND, INITIAL ENCOUNTER: ICD-10-CM

## 2022-11-23 DIAGNOSIS — S68.612D: Primary | ICD-10-CM

## 2022-11-23 DIAGNOSIS — S68.612A COMPLETE TRAUMATIC AMPUTATION OF RIGHT MIDDLE FINGER THROUGH PHALANX, INITIAL ENCOUNTER: ICD-10-CM

## 2022-11-23 PROCEDURE — 99213 OFFICE O/P EST LOW 20 MIN: CPT | Performed by: NURSE PRACTITIONER

## 2022-11-23 PROCEDURE — 99213 OFFICE O/P EST LOW 20 MIN: CPT

## 2022-11-23 RX ORDER — BACITRACIN, NEOMYCIN, POLYMYXIN B 400; 3.5; 5 [USP'U]/G; MG/G; [USP'U]/G
OINTMENT TOPICAL ONCE
Status: CANCELLED | OUTPATIENT
Start: 2022-11-23 | End: 2022-11-23

## 2022-11-23 RX ORDER — LIDOCAINE HYDROCHLORIDE 40 MG/ML
SOLUTION TOPICAL ONCE
Status: CANCELLED | OUTPATIENT
Start: 2022-11-23 | End: 2022-11-23

## 2022-11-23 RX ORDER — LIDOCAINE 40 MG/G
CREAM TOPICAL ONCE
Status: CANCELLED | OUTPATIENT
Start: 2022-11-23 | End: 2022-11-23

## 2022-11-23 RX ORDER — LIDOCAINE 50 MG/G
OINTMENT TOPICAL ONCE
Status: CANCELLED | OUTPATIENT
Start: 2022-11-23 | End: 2022-11-23

## 2022-11-23 RX ORDER — GENTAMICIN SULFATE 1 MG/G
OINTMENT TOPICAL ONCE
Status: CANCELLED | OUTPATIENT
Start: 2022-11-23 | End: 2022-11-23

## 2022-11-23 RX ORDER — GINSENG 100 MG
CAPSULE ORAL ONCE
Status: CANCELLED | OUTPATIENT
Start: 2022-11-23 | End: 2022-11-23

## 2022-11-23 RX ORDER — BACITRACIN ZINC AND POLYMYXIN B SULFATE 500; 1000 [USP'U]/G; [USP'U]/G
OINTMENT TOPICAL ONCE
Status: CANCELLED | OUTPATIENT
Start: 2022-11-23 | End: 2022-11-23

## 2022-11-23 ASSESSMENT — PAIN - FUNCTIONAL ASSESSMENT: PAIN_FUNCTIONAL_ASSESSMENT: PREVENTS OR INTERFERES SOME ACTIVE ACTIVITIES AND ADLS

## 2022-11-23 ASSESSMENT — PAIN DESCRIPTION - LOCATION: LOCATION: HAND

## 2022-11-23 ASSESSMENT — PAIN DESCRIPTION - PAIN TYPE: TYPE: ACUTE PAIN

## 2022-11-23 ASSESSMENT — PAIN DESCRIPTION - ONSET: ONSET: ON-GOING

## 2022-11-23 ASSESSMENT — PAIN DESCRIPTION - ORIENTATION: ORIENTATION: LEFT

## 2022-11-23 ASSESSMENT — PAIN DESCRIPTION - FREQUENCY: FREQUENCY: INTERMITTENT

## 2022-11-23 ASSESSMENT — PAIN SCALES - GENERAL: PAINLEVEL_OUTOF10: 8

## 2022-11-23 ASSESSMENT — PAIN DESCRIPTION - DESCRIPTORS: DESCRIPTORS: DULL

## 2022-11-23 NOTE — LETTER
[facility]  [address]  Phone: [phone]    Ruslan Huerta MD        November 23, 2022     Patient: Josh Abbott   YOB: 2000   Date of Visit: 11/23/2022       To Whom it May Concern:    Navdeep Mitchell was seen in my clinic on 11/23/2022. Please excuse his absence. If you have any questions or concerns, please don't hesitate to call.     Sincerely,     Elena DAVIS

## 2022-11-23 NOTE — PROGRESS NOTES
Wound Care Center Progress Note       Renate Elizalde  AGE: 25 y.o. GENDER: male  : 2000  TODAY'S DATE:  2022        Subjective:     Chief Complaint   Patient presents with    Wound Check     3rd finger left hand         HISTORY of PRESENT ILLNESS     Renate Elizalde is a 25 y.o. 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 The condition is of mild severity. The ulcer has been present since 10/6/22. The underlying cause is thought to be traumatic work related crushing injury from a 50 pound door falling on his finger. The patients care to date has included evaluation and treatment at [facility] with left long finger tip amputation with care per Dr. Marianne Ross with antibiotics and referral to the wound clinic. The patient has significant underlying medical conditions as below. 2022: Patient wound is nearly healed. Clean and dry with only 2 small open areas. Patient reports some pain with wrapping and dressing changes, but at rest there are no issues     Wound Pain Timing/Severity: intermittent, mild  Quality of pain: dull, aching, tender  Severity of pain:  2 / 10   Modifying Factors:  vaping  Associated Signs/Symptoms: edema, drainage, and pain        PAST MEDICAL HISTORY        Diagnosis Date    Asthma        PAST SURGICAL HISTORY    Past Surgical History:   Procedure Laterality Date    KNEE SURGERY Left 2019       FAMILY HISTORY    History reviewed. No pertinent family history.     SOCIAL HISTORY    Social History     Tobacco Use    Smoking status: Never    Smokeless tobacco: Never    Tobacco comments:     Encouraged to stop vapping   Vaping Use    Vaping Use: Every day   Substance Use Topics    Alcohol use: Never     Comment: Caffeine: coffee (sometimes), energy drink (here and there), pop     Drug use: Never       ALLERGIES    Allergies   Allergen Reactions    Albumen, Egg Itching    Asmanex (120 Metered Doses) [Mometasone Furoate]      Patient reports this gives him hot flashes and dizziness. MEDICATIONS    Current Outpatient Medications on File Prior to Encounter   Medication Sig Dispense Refill    naproxen (NAPROSYN) 500 MG tablet Take 1 tablet by mouth 2 times daily as needed for Pain 20 tablet 0    acetaminophen (AMINOFEN) 325 MG tablet Take 2 tablets by mouth every 6 hours as needed for Pain 20 tablet 0    albuterol sulfate  (90 Base) MCG/ACT inhaler Inhale 2 puffs into the lungs 4 times daily as needed      budesonide-formoterol (SYMBICORT) 160-4.5 MCG/ACT AERO Inhale 2 puffs into the lungs 2 times daily      tiotropium (SPIRIVA RESPIMAT) 2.5 MCG/ACT AERS inhaler Inhale 2 puffs into the lungs 2 times daily      albuterol (PROVENTIL) (2.5 MG/3ML) 0.083% nebulizer solution Take 3 mLs by nebulization every 4 hours as needed for Wheezing or Shortness of Breath (Cough) 120 each 3     No current facility-administered medications on file prior to encounter. REVIEW OF SYSTEMS    Pertinent items are noted in HPI. Constitutional: Negative for systemic symptoms including fever, chills and malaise. Objective:      /67   Pulse 63   Temp 99.9 °F (37.7 °C) (Temporal)   Resp 16     PHYSICAL EXAM      General: The patient is in no acute distress. Mental status:  Patient is appropriate, is  oriented to place and plan of care. Dermatologic exam: Visual inspection of the periwound reveals the skin to be normal in turgor and texture and dry. Wound exam:  see wound description below     All active wounds listed below with today's date are evaluated      Wound 11/16/22 Finger (Comment which one) Anterior; Left #1 3rd finger (Active)   Wound Image   11/16/22 0901   Wound Etiology Traumatic 11/23/22 0843   Dressing Status New dressing applied 11/16/22 0924   Wound Cleansed Wound cleanser 11/16/22 0901   Offloading for Diabetic Foot Ulcers Offloading not required 11/16/22 0924   Wound Length (cm) 0.1 cm 11/23/22 0843   Wound Width (cm) 0.1 cm 11/23/22 0843   Wound Depth (cm) 0.1 cm 11/23/22 0843   Wound Surface Area (cm^2) 0.01 cm^2 11/23/22 0843   Wound Volume (cm^3) 0.001 cm^3 11/23/22 0843   Post-Procedure Length (cm) 0.2 cm 11/16/22 0919   Post-Procedure Width (cm) 0.5 cm 11/16/22 0919   Post-Procedure Depth (cm) 0.1 cm 11/16/22 0919   Post-Procedure Surface Area (cm^2) 0.1 cm^2 11/16/22 0919   Post-Procedure Volume (cm^3) 0.01 cm^3 11/16/22 0919   Distance Tunneling (cm) 0 cm 11/23/22 0843   Tunneling Position ___ O'Clock 0 11/23/22 0843   Undermining Starts ___ O'Clock 0 11/23/22 0843   Undermining Ends___ O'Clock 0 11/23/22 0843   Undermining Maxium Distance (cm) 0 11/23/22 0843   Wound Assessment Pink/red 11/23/22 0843   Drainage Amount None 11/23/22 0843   Odor None 11/23/22 0843   Letty-wound Assessment Intact 11/23/22 0843   Margins Defined edges 11/23/22 0843   Number of days: 7       Assessment:       Problem List Items Addressed This Visit          Other    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 Continue plan of care for now and follow up 1 week         Plan:     Discharge Instructions         [address]     Wound cleansing:              Do not scrub or use excessive force. Wash hands with soap and water before and after dressing changes. Prior to applying a clean dressing, cleanse wound with normal saline,               wound cleanser, or mild soap and water. Ask the physician or nurse before getting the wound(s) wet in a shower     Daily Wound management:             Keep weight off wounds and reposition every 2 hours. Avoid standing for long periods of time. Apply wraps/stockings in AM and remove at bedtime. If swelling is present, elevate legs to the level of the heart or above for              30 minutes 4-5 times a day and/or when sitting. When taking antibiotics take entire prescription as ordered by              physician do not stop taking until medicine is all gone. Grafts:                Wound Care Notes:                                        Orders for this week:22                 Left Hand Wounds:Wash with soap and water. Pat dry. Apply  anasept and stimulen  to wound bed  Cover with xeroform and small gentac border  Wrap with 1 inch coban     Change daily        Follow Up Instructions: At the [address] [place] [address] in 1 week   Primary Wound Care Provider: Dr Nick Gilliam   Call  for any questions or concerns.   Central [phone] for imaging lab work        Treatment Note      Written Patient Dismissal Instructions Given            Electronically signed by Gerhardt Jobs, APRN - CNP on 2022 at 9:03 AM

## 2022-11-29 NOTE — DISCHARGE INSTRUCTIONS
PHYSICIAN ORDERS AND DISCHARGE INSTRUCTIONS     Wound cleansing:              Do not scrub or use excessive force. Wash hands with soap and water before and after dressing changes. Prior to applying a clean dressing, cleanse wound with normal saline,               wound cleanser, or mild soap and water. Ask the physician or nurse before getting the wound(s) wet in a shower     Daily Wound management:             Keep weight off wounds and reposition every 2 hours. Avoid standing for long periods of time. Apply wraps/stockings in AM and remove at bedtime. If swelling is present, elevate legs to the level of the heart or above for              30 minutes 4-5 times a day and/or when sitting. When taking antibiotics take entire prescription as ordered by              physician do not stop taking until medicine is all gone. Grafts:                Wound Care Notes:                                        Orders for this week:22                 Left Hand Wounds:Wash with soap and water. Pat dry. Apply  santyl and  stimulen  to wound bed  Cover with xeroform and conform  Secure with coban   Wrap with 1 inch coban     Change every other day         Follow Up Instructions: At the [address] [facility] in 1 week   Primary Wound Care Provider: Dr Rosy Valdes   Call  for any questions or concerns.   Central [phone] for imaging lab work

## 2022-11-30 ENCOUNTER — HOSPITAL ENCOUNTER (OUTPATIENT)
Dept: WOUND CARE | Age: 22
Discharge: HOME | End: 2022-11-30
Payer: COMMERCIAL

## 2022-11-30 DIAGNOSIS — S68.612A COMPLETE TRAUMATIC AMPUTATION OF RIGHT MIDDLE FINGER THROUGH PHALANX, INITIAL ENCOUNTER: Primary | ICD-10-CM

## 2022-11-30 PROCEDURE — 99213 OFFICE O/P EST LOW 20 MIN: CPT

## 2022-11-30 RX ORDER — BACITRACIN, NEOMYCIN, POLYMYXIN B 400; 3.5; 5 [USP'U]/G; MG/G; [USP'U]/G
OINTMENT TOPICAL ONCE
OUTPATIENT
Start: 2022-11-30 | End: 2022-11-30

## 2022-11-30 RX ORDER — LIDOCAINE HYDROCHLORIDE 40 MG/ML
SOLUTION TOPICAL ONCE
OUTPATIENT
Start: 2022-11-30 | End: 2022-11-30

## 2022-11-30 RX ORDER — LIDOCAINE 50 MG/G
OINTMENT TOPICAL ONCE
OUTPATIENT
Start: 2022-11-30 | End: 2022-11-30

## 2022-11-30 RX ORDER — BACITRACIN ZINC AND POLYMYXIN B SULFATE 500; 1000 [USP'U]/G; [USP'U]/G
OINTMENT TOPICAL ONCE
OUTPATIENT
Start: 2022-11-30 | End: 2022-11-30

## 2022-11-30 RX ORDER — GENTAMICIN SULFATE 1 MG/G
OINTMENT TOPICAL ONCE
OUTPATIENT
Start: 2022-11-30 | End: 2022-11-30

## 2022-11-30 RX ORDER — GINSENG 100 MG
CAPSULE ORAL ONCE
OUTPATIENT
Start: 2022-11-30 | End: 2022-11-30

## 2022-11-30 RX ORDER — LIDOCAINE 40 MG/G
CREAM TOPICAL ONCE
OUTPATIENT
Start: 2022-11-30 | End: 2022-11-30

## 2022-11-30 NOTE — PROGRESS NOTES
Nonselective enzymatic debridement performed with Santyl per physician order to wound left hand wound. Patient tolerated the procedure well.         .Electronically signed by Alexandr Francis LPN on 91/21/9860 at 9:49 AM

## 2022-11-30 NOTE — LETTER
[facility]  [address]  Phone: [phone]    Bianca Knox MD        November 30, 2022     Patient: Javier Yusuf   YOB: 2000   Date of Visit: 11/30/2022       To Whom It May Concern:    Makenzie Malone was seen in the clinic today from 1 to _______. It is my medical opinion that Makenzie Malone may return to full duty immediately with no restrictions. If you have any questions or concerns, please don't hesitate to call.     Sincerely,        Bianac Knox MD

## 2022-11-30 NOTE — PROGRESS NOTES
Wound Care Center Progress Note       Shonna Bishop  AGE: 25 y.o. GENDER: male  : 2000  TODAY'S DATE:  2022        Subjective:     Chief Complaint   Patient presents with    Wound Check     Left hand          HISTORY of PRESENT ILLNESS     Shonna Bishop is a 25 y.o. male who presents today for wound evaluation of Chronic traumatic ulcer(s) of right middle finger, distal tip. The ulcer is of mild severity. The underlying cause of the wound is trauma. Almost healed. Wound Pain Timing/Severity: waxing and waning, moderate  Quality of pain: burning  Severity of pain:  3 / 10   Modifying Factors: none  Associated Signs/Symptoms: drainage and pain        PAST MEDICAL HISTORY        Diagnosis Date    Asthma        PAST SURGICAL HISTORY    Past Surgical History:   Procedure Laterality Date    KNEE SURGERY Left 2019       FAMILY HISTORY    History reviewed. No pertinent family history. SOCIAL HISTORY    Social History     Tobacco Use    Smoking status: Never    Smokeless tobacco: Never    Tobacco comments:     Encouraged to stop vapping   Vaping Use    Vaping Use: Every day   Substance Use Topics    Alcohol use: Never     Comment: Caffeine: coffee (sometimes), energy drink (here and there), pop     Drug use: Never       ALLERGIES    Allergies   Allergen Reactions    Albumen, Egg Itching    Asmanex (120 Metered Doses) [Mometasone Furoate]      Patient reports this gives him hot flashes and dizziness.        MEDICATIONS    Current Outpatient Medications on File Prior to Encounter   Medication Sig Dispense Refill    naproxen (NAPROSYN) 500 MG tablet Take 1 tablet by mouth 2 times daily as needed for Pain 20 tablet 0    acetaminophen (AMINOFEN) 325 MG tablet Take 2 tablets by mouth every 6 hours as needed for Pain 20 tablet 0    albuterol sulfate  (90 Base) MCG/ACT inhaler Inhale 2 puffs into the lungs 4 times daily as needed      budesonide-formoterol (SYMBICORT) 160-4.5 MCG/ACT AERO Inhale 2 puffs into the lungs 2 times daily      tiotropium (SPIRIVA RESPIMAT) 2.5 MCG/ACT AERS inhaler Inhale 2 puffs into the lungs 2 times daily      albuterol (PROVENTIL) (2.5 MG/3ML) 0.083% nebulizer solution Take 3 mLs by nebulization every 4 hours as needed for Wheezing or Shortness of Breath (Cough) 120 each 3     No current facility-administered medications on file prior to encounter. REVIEW OF SYSTEMS    Pertinent items are noted in HPI. Constitutional: Negative for systemic symptoms including fever, chills and malaise. Objective: There were no vitals taken for this visit. PHYSICAL EXAM      General: The patient is in no acute distress. Mental status:  Patient is appropriate, is  oriented to place and plan of care. Dermatologic exam: Visual inspection of the periwound reveals the skin to be moist.  Wound exam:  see wound description below     All active wounds listed below with today's date are evaluated      Wound 11/16/22 Finger (Comment which one) Anterior; Left #1 3rd finger (Active)   Wound Image   11/16/22 0901   Wound Etiology Traumatic 11/30/22 0903   Dressing Status New dressing applied 11/16/22 0924   Wound Cleansed Wound cleanser 11/30/22 0903   Offloading for Diabetic Foot Ulcers Offloading not required 11/30/22 0903   Wound Length (cm) 1 cm 11/30/22 0903   Wound Width (cm) 1 cm 11/30/22 0903   Wound Depth (cm) 0.1 cm 11/30/22 0903   Wound Surface Area (cm^2) 1 cm^2 11/30/22 0903   Wound Volume (cm^3) 0.1 cm^3 11/30/22 0903   Post-Procedure Length (cm) 1 cm 11/30/22 0925   Post-Procedure Width (cm) 1 cm 11/30/22 0925   Post-Procedure Depth (cm) 0.1 cm 11/30/22 0925   Post-Procedure Surface Area (cm^2) 1 cm^2 11/30/22 0925   Post-Procedure Volume (cm^3) 0.1 cm^3 11/30/22 0925   Distance Tunneling (cm) 0 cm 11/30/22 0903   Tunneling Position ___ O'Clock 0 11/30/22 0903   Undermining Starts ___ O'Clock 0 11/30/22 0903   Undermining Ends___ O'Clock 0 11/30/22 0903   Undermining Maxium Distance (cm) 0 11/30/22 0903   Wound Assessment Pink/red 11/30/22 0903   Drainage Amount Moderate 11/30/22 0903   Drainage Description Serosanguinous 11/30/22 0903   Odor None 11/30/22 0903   Letty-wound Assessment Intact 11/30/22 0903   Margins Defined edges 11/30/22 0903   Wound Thickness Description not for Pressure Injury Full thickness 11/30/22 0903   Number of days: 14       Assessment:       Problem List Items Addressed This Visit       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 Plan:     Discharge Instructions         PHYSICIAN ORDERS AND DISCHARGE INSTRUCTIONS     Wound cleansing:              Do not scrub or use excessive force. Wash hands with soap and water before and after dressing changes. Prior to applying a clean dressing, cleanse wound with normal saline,               wound cleanser, or mild soap and water. Ask the physician or nurse before getting the wound(s) wet in a shower     Daily Wound management:             Keep weight off wounds and reposition every 2 hours. Avoid standing for long periods of time. Apply wraps/stockings in AM and remove at bedtime. If swelling is present, elevate legs to the level of the heart or above for              30 minutes 4-5 times a day and/or when sitting. When taking antibiotics take entire prescription as ordered by              physician do not stop taking until medicine is all gone. Grafts:                Wound Care Notes:                                        Orders for this week:11/30/22                 Left Hand Wounds:Wash with soap and water. Pat dry.    Apply  santyl and  stimulen  to wound bed  Cover with xeroform and conform  Secure with coban   Wrap with 1 inch coban     Change every other day         Follow Up Instructions: At the [address] in 1 week   Primary Wound Care Provider: Dr Adina Esquivel   Call  for any questions or concerns.   Central [phone] for imaging lab work        Treatment Note      Written Patient Dismissal Instructions Given            Electronically signed by Bianca Knox MD on 2022 at 9:35 AM

## 2022-12-07 ENCOUNTER — HOSPITAL ENCOUNTER (OUTPATIENT)
Dept: WOUND CARE | Age: 22
Discharge: HOME | End: 2022-12-07
Payer: COMMERCIAL

## 2022-12-07 VITALS
RESPIRATION RATE: 16 BRPM | SYSTOLIC BLOOD PRESSURE: 109 MMHG | HEART RATE: 60 BPM | DIASTOLIC BLOOD PRESSURE: 58 MMHG | TEMPERATURE: 97.6 F

## 2022-12-07 DIAGNOSIS — S68.612A COMPLETE TRAUMATIC AMPUTATION OF RIGHT MIDDLE FINGER THROUGH PHALANX, INITIAL ENCOUNTER: Primary | ICD-10-CM

## 2022-12-07 PROCEDURE — 11042 DBRDMT SUBQ TIS 1ST 20SQCM/<: CPT

## 2022-12-07 RX ORDER — LIDOCAINE 40 MG/G
CREAM TOPICAL ONCE
OUTPATIENT
Start: 2022-12-07 | End: 2022-12-07

## 2022-12-07 RX ORDER — GINSENG 100 MG
CAPSULE ORAL ONCE
OUTPATIENT
Start: 2022-12-07 | End: 2022-12-07

## 2022-12-07 RX ORDER — BACITRACIN, NEOMYCIN, POLYMYXIN B 400; 3.5; 5 [USP'U]/G; MG/G; [USP'U]/G
OINTMENT TOPICAL ONCE
OUTPATIENT
Start: 2022-12-07 | End: 2022-12-07

## 2022-12-07 RX ORDER — BACITRACIN ZINC AND POLYMYXIN B SULFATE 500; 1000 [USP'U]/G; [USP'U]/G
OINTMENT TOPICAL ONCE
OUTPATIENT
Start: 2022-12-07 | End: 2022-12-07

## 2022-12-07 RX ORDER — LIDOCAINE HYDROCHLORIDE 40 MG/ML
SOLUTION TOPICAL ONCE
OUTPATIENT
Start: 2022-12-07 | End: 2022-12-07

## 2022-12-07 RX ORDER — GENTAMICIN SULFATE 1 MG/G
OINTMENT TOPICAL ONCE
OUTPATIENT
Start: 2022-12-07 | End: 2022-12-07

## 2022-12-07 RX ORDER — LIDOCAINE 50 MG/G
OINTMENT TOPICAL ONCE
OUTPATIENT
Start: 2022-12-07 | End: 2022-12-07

## 2022-12-07 ASSESSMENT — PAIN SCALES - GENERAL: PAINLEVEL_OUTOF10: 0

## 2022-12-07 NOTE — DISCHARGE INSTRUCTIONS
PHYSICIAN ORDERS AND DISCHARGE INSTRUCTIONS     Wound cleansing:              Do not scrub or use excessive force. Wash hands with soap and water before and after dressing changes. Prior to applying a clean dressing, cleanse wound with normal saline,               wound cleanser, or mild soap and water. Ask the physician or nurse before getting the wound(s) wet in a shower     Daily Wound management:             Keep weight off wounds and reposition every 2 hours. Avoid standing for long periods of time. Apply wraps/stockings in AM and remove at bedtime. If swelling is present, elevate legs to the level of the heart or above for              30 minutes 4-5 times a day and/or when sitting. When taking antibiotics take entire prescription as ordered by              physician do not stop taking until medicine is all gone. Grafts:                Wound Care Notes:                                        Orders for this week:22                 Left Hand Wounds:Wash with soap and water. Pat dry. Apply  joey  to wound bed (give remainder to pt)   Cover with xeroform and conform  Secure with coban   Wrap with 1 inch coban     Change every other day         Follow Up Instructions: At the [address] [facility] in 1 week   Primary Wound Care Provider: Dr Lyman Labor   Call  for any questions or concerns.   Central [phone] for imaging lab work

## 2022-12-07 NOTE — PROGRESS NOTES
Wound Care Center Progress Note With Procedure    Renate Elizalde  AGE: 25 y.o. GENDER: male  : 2000  EPISODE DATE:  2022     Subjective:     Chief Complaint   Patient presents with    Wound Check     Left hand         HISTORY of PRESENT ILLNESS      Renate Elizalde is a 25 y.o. male who presents today for wound evaluation of Chronic traumatic ulcer(s) of the left finger. The ulcer is of mild severity. The underlying cause of the wound is trauma. Almost healed. Wound Pain Timing/Severity: waxing and waning, moderate  Quality of pain: squeezing, shooting, tender  Severity of pain:  3 / 10   Modifying Factors: smoking  Associated Signs/Symptoms: drainage        PAST MEDICAL HISTORY        Diagnosis Date    Asthma        PAST SURGICAL HISTORY    Past Surgical History:   Procedure Laterality Date    KNEE SURGERY Left 2019       FAMILY HISTORY    History reviewed. No pertinent family history. SOCIAL HISTORY    Social History     Tobacco Use    Smoking status: Never    Smokeless tobacco: Never    Tobacco comments:     Encouraged to stop vapping   Vaping Use    Vaping Use: Every day   Substance Use Topics    Alcohol use: Never     Comment: Caffeine: coffee (sometimes), energy drink (here and there), pop     Drug use: Never       ALLERGIES    Allergies   Allergen Reactions    Albumen, Egg Itching    Asmanex (120 Metered Doses) [Mometasone Furoate]      Patient reports this gives him hot flashes and dizziness.        MEDICATIONS    Current Outpatient Medications on File Prior to Encounter   Medication Sig Dispense Refill    naproxen (NAPROSYN) 500 MG tablet Take 1 tablet by mouth 2 times daily as needed for Pain 20 tablet 0    acetaminophen (AMINOFEN) 325 MG tablet Take 2 tablets by mouth every 6 hours as needed for Pain 20 tablet 0    albuterol sulfate  (90 Base) MCG/ACT inhaler Inhale 2 puffs into the lungs 4 times daily as needed      budesonide-formoterol (SYMBICORT) 160-4.5 MCG/ACT AERO Inhale 2 puffs into the lungs 2 times daily      tiotropium (SPIRIVA RESPIMAT) 2.5 MCG/ACT AERS inhaler Inhale 2 puffs into the lungs 2 times daily      albuterol (PROVENTIL) (2.5 MG/3ML) 0.083% nebulizer solution Take 3 mLs by nebulization every 4 hours as needed for Wheezing or Shortness of Breath (Cough) 120 each 3     No current facility-administered medications on file prior to encounter. REVIEW OF SYSTEMS    Pertinent items are noted in HPI. Constitutional: Negative for systemic symptoms including fever, chills and malaise. Objective:      BP (!) 109/58   Pulse 60   Temp 97.6 °F (36.4 °C) (Temporal)   Resp 16     PHYSICAL EXAM      General: The patient is in no acute distress. Mental status:  Patient is appropriate, is  oriented to place and plan of care. 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 Performed by: Grei Scott MD    Consent obtained: Yes    Time out taken:  Yes    Pain Control:       Debridement:Excisional Debridement    Using #15 blade scalpel and forceps the wound(s) was/were sharply debrided down through and including the removal of subcutaneous tissue.         Devitalized Tissue Debrided:  slough and exudate    Pre Debridement Measurements:  Are located in the Wound Documentation Flow Sheet    All active wounds listed below with today's date are evaluated  Wound(s)    debrided this date include # : 1     Post  Debridement Measurements:  Wound 11/16/22 Finger (Comment which one) Anterior; Left #1 3rd finger (Active)   Wound Image   12/07/22 0841   Wound Etiology Traumatic 12/07/22 0841   Dressing Status New dressing applied 11/30/22 0947   Wound Cleansed Wound cleanser 12/07/22 0841   Offloading for Diabetic Foot Ulcers Offloading not required 12/07/22 0841   Wound Length (cm) 0.7 cm 12/07/22 0841   Wound Width (cm) 0.7 cm 12/07/22 0841   Wound Depth (cm) 0.1 cm 12/07/22 0841   Wound Surface Area (cm^2) 0.49 cm^2 12/07/22 0841   Wound Volume (cm^3) 0.049 cm^3 12/07/22 0841   Post-Procedure Length (cm) 0.7 cm 12/07/22 0901   Post-Procedure Width (cm) 0.7 cm 12/07/22 0901   Post-Procedure Depth (cm) 0.1 cm 12/07/22 0901   Post-Procedure Surface Area (cm^2) 0.49 cm^2 12/07/22 0901   Post-Procedure Volume (cm^3) 0.049 cm^3 12/07/22 0901   Distance Tunneling (cm) 0 cm 12/07/22 0841   Tunneling Position ___ O'Clock 0 12/07/22 0841   Undermining Starts ___ O'Clock 0 12/07/22 0841   Undermining Ends___ O'Clock 0 12/07/22 0841   Undermining Maxium Distance (cm) 0 12/07/22 0841   Wound Assessment Devitalized tissue 12/07/22 0841   Drainage Amount Small 12/07/22 0841   Drainage Description Yellow;Serosanguinous 12/07/22 0841   Odor None 12/07/22 0841   Letty-wound Assessment Intact 12/07/22 0841   Margins Defined edges 12/07/22 0841   Wound Thickness Description not for Pressure Injury Full thickness 12/07/22 0841   Number of days: 21       Percent of Wound(s) Debrided: approximately 100%    Total  Area  Debrided:  0.2 sq cm     Bleeding:  None    Hemostasis Achieved:  not needed    Procedural Pain:  0  / 10     Post Procedural Pain:  0 / 10     Response to treatment:  Well tolerated by patient. Status of wound progress and description from last visit:   Almost healed. Plan:       Discharge Instructions         PHYSICIAN ORDERS AND DISCHARGE INSTRUCTIONS     Wound cleansing:              Do not scrub or use excessive force. Wash hands with soap and water before and after dressing changes. Prior to applying a clean dressing, cleanse wound with normal saline,               wound cleanser, or mild soap and water. Ask the physician or nurse before getting the wound(s) wet in a shower     Daily Wound management:             Keep weight off wounds and reposition every 2 hours. Avoid standing for long periods of time. Apply wraps/stockings in AM and remove at bedtime. If swelling is present, elevate legs to the level of the heart or above for              30 minutes 4-5 times a day and/or when sitting. When taking antibiotics take entire prescription as ordered by              physician do not stop taking until medicine is all gone. Grafts:                Wound Care Notes:                                        Orders for this week:22                 Left Hand Wounds:Wash with soap and water. Pat dry. Apply  joey  to wound bed (give remainder to pt)   Cover with xeroform and conform  Secure with coban   Wrap with 1 inch coban     Change every other day         Follow Up Instructions: At the [address] [place] [address] in 1 week   Primary Wound Care Provider: Dr Nerissa Moyer   Call  for any questions or concerns.   Central [phone] for imaging lab work        Treatment Note      Written Patient Dismissal Instructions Given            Electronically signed by Geri Scott MD on 2022 at 9:16 AM

## 2022-12-07 NOTE — LETTER
[facility]  [address]  Phone: [phone]    Ledy Engle MD        December 7, 2022     Patient: Kaley Mccullough   YOB: 2000   Date of Visit: 12/7/2022       To Whom It May Concern:  Tarun Salguero was seen in the clinic today from 21  to 930 am   It is my medical opinion that Tarun Salguero may return to full duty immediately with no restrictions. If you have any questions or concerns, please don't hesitate to call.     Sincerely,        Ledy Engle MD

## 2022-12-08 NOTE — DISCHARGE INSTRUCTIONS
PHYSICIAN ORDERS AND DISCHARGE INSTRUCTIONS     Wound cleansing:              Do not scrub or use excessive force. Wash hands with soap and water before and after dressing changes. Prior to applying a clean dressing, cleanse wound with normal saline,               wound cleanser, or mild soap and water. Ask the physician or nurse before getting the wound(s) wet in a shower     Daily Wound management:             Keep weight off wounds and reposition every 2 hours. Avoid standing for long periods of time. Apply wraps/stockings in AM and remove at bedtime. If swelling is present, elevate legs to the level of the heart or above for              30 minutes 4-5 times a day and/or when sitting. When taking antibiotics take entire prescription as ordered by              physician do not stop taking until medicine is all gone. Grafts:                Wound Care Notes:           Consulting Dr Steven Post to discuss options 22                           Orders for this week:22                 Left Hand Wounds:Wash with soap and water. Pat dry. Apply  joey  to wound bed (give remainder to pt)   Cover with xeroform and conform  Secure with coban   Wrap with 1 inch coban     Change every other day         Follow Up Instructions: At the [address] [place] [address] in 3 week   Primary Wound Care Provider: Dr Marcia Liu   Call  for any questions or concerns.   Central [phone] for imaging lab work

## 2022-12-14 ENCOUNTER — HOSPITAL ENCOUNTER (OUTPATIENT)
Dept: WOUND CARE | Age: 22
Discharge: HOME | End: 2022-12-14
Payer: COMMERCIAL

## 2022-12-14 VITALS
TEMPERATURE: 98 F | DIASTOLIC BLOOD PRESSURE: 68 MMHG | SYSTOLIC BLOOD PRESSURE: 102 MMHG | HEART RATE: 66 BPM | RESPIRATION RATE: 20 BRPM

## 2022-12-14 DIAGNOSIS — S68.612A COMPLETE TRAUMATIC AMPUTATION OF RIGHT MIDDLE FINGER THROUGH PHALANX, INITIAL ENCOUNTER: Primary | ICD-10-CM

## 2022-12-14 PROCEDURE — 99213 OFFICE O/P EST LOW 20 MIN: CPT

## 2022-12-14 PROCEDURE — 6370000000 HC RX 637 (ALT 250 FOR IP): Performed by: NURSE PRACTITIONER

## 2022-12-14 RX ORDER — LIDOCAINE 50 MG/G
OINTMENT TOPICAL ONCE
Status: COMPLETED | OUTPATIENT
Start: 2022-12-14 | End: 2022-12-14

## 2022-12-14 RX ORDER — BACITRACIN ZINC AND POLYMYXIN B SULFATE 500; 1000 [USP'U]/G; [USP'U]/G
OINTMENT TOPICAL ONCE
OUTPATIENT
Start: 2022-12-14 | End: 2022-12-14

## 2022-12-14 RX ORDER — LIDOCAINE 40 MG/G
CREAM TOPICAL ONCE
OUTPATIENT
Start: 2022-12-14 | End: 2022-12-14

## 2022-12-14 RX ORDER — GENTAMICIN SULFATE 1 MG/G
OINTMENT TOPICAL ONCE
OUTPATIENT
Start: 2022-12-14 | End: 2022-12-14

## 2022-12-14 RX ORDER — LIDOCAINE 50 MG/G
OINTMENT TOPICAL ONCE
OUTPATIENT
Start: 2022-12-14 | End: 2022-12-14

## 2022-12-14 RX ORDER — GINSENG 100 MG
CAPSULE ORAL ONCE
OUTPATIENT
Start: 2022-12-14 | End: 2022-12-14

## 2022-12-14 RX ORDER — BACITRACIN, NEOMYCIN, POLYMYXIN B 400; 3.5; 5 [USP'U]/G; MG/G; [USP'U]/G
OINTMENT TOPICAL ONCE
OUTPATIENT
Start: 2022-12-14 | End: 2022-12-14

## 2022-12-14 RX ORDER — LIDOCAINE HYDROCHLORIDE 40 MG/ML
SOLUTION TOPICAL ONCE
OUTPATIENT
Start: 2022-12-14 | End: 2022-12-14

## 2022-12-14 RX ADMIN — LIDOCAINE: 50 OINTMENT TOPICAL at 08:34

## 2022-12-14 ASSESSMENT — PAIN - FUNCTIONAL ASSESSMENT: PAIN_FUNCTIONAL_ASSESSMENT: PREVENTS OR INTERFERES SOME ACTIVE ACTIVITIES AND ADLS

## 2022-12-14 ASSESSMENT — PAIN DESCRIPTION - FREQUENCY: FREQUENCY: INTERMITTENT

## 2022-12-14 ASSESSMENT — PAIN SCALES - GENERAL: PAINLEVEL_OUTOF10: 2

## 2022-12-14 ASSESSMENT — PAIN DESCRIPTION - PAIN TYPE: TYPE: ACUTE PAIN

## 2022-12-14 ASSESSMENT — PAIN DESCRIPTION - LOCATION: LOCATION: FINGER (COMMENT WHICH ONE)

## 2022-12-14 ASSESSMENT — PAIN DESCRIPTION - ORIENTATION: ORIENTATION: RIGHT

## 2022-12-14 ASSESSMENT — PAIN DESCRIPTION - DESCRIPTORS: DESCRIPTORS: PINS AND NEEDLES

## 2022-12-14 ASSESSMENT — PAIN DESCRIPTION - ONSET: ONSET: ON-GOING

## 2022-12-14 NOTE — PROGRESS NOTES
Wound Care Center Progress Note       Josh Abbott  AGE: 25 y.o. GENDER: male  : 2000  TODAY'S DATE:  2022        Subjective:     No chief complaint on file. HISTORY of PRESENT ILLNESS     Josh Abbott is a 25 y.o. 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 The ulcer is of mild severity. The underlying cause of the wound is trauma. Still very neuropathic. Wound Pain Timing/Severity: waxing and waning, severe  Quality of pain: burning, squeezing, throbbing, shooting, tender  Severity of pain:  6 / 10   Modifying Factors: none  Associated Signs/Symptoms: drainage and pain        PAST MEDICAL HISTORY        Diagnosis Date    Asthma        PAST SURGICAL HISTORY    Past Surgical History:   Procedure Laterality Date    KNEE SURGERY Left 2019       FAMILY HISTORY    History reviewed. No pertinent family history. SOCIAL HISTORY    Social History     Tobacco Use    Smoking status: Never    Smokeless tobacco: Never    Tobacco comments:     Encouraged to stop vapping   Vaping Use    Vaping Use: Every day   Substance Use Topics    Alcohol use: Never     Comment: Caffeine: coffee (sometimes), energy drink (here and there), pop     Drug use: Never       ALLERGIES    Allergies   Allergen Reactions    Albumen, Egg Itching    Asmanex (120 Metered Doses) [Mometasone Furoate]      Patient reports this gives him hot flashes and dizziness.        MEDICATIONS    Current Outpatient Medications on File Prior to Encounter   Medication Sig Dispense Refill    naproxen (NAPROSYN) 500 MG tablet Take 1 tablet by mouth 2 times daily as needed for Pain 20 tablet 0    acetaminophen (AMINOFEN) 325 MG tablet Take 2 tablets by mouth every 6 hours as needed for Pain 20 tablet 0    albuterol sulfate  (90 Base) MCG/ACT inhaler Inhale 2 puffs into the lungs 4 times daily as needed      budesonide-formoterol (SYMBICORT) 160-4.5 MCG/ACT AERO Inhale 2 puffs into the lungs 2 times daily      tiotropium (SPIRIVA RESPIMAT) 2.5 MCG/ACT AERS inhaler Inhale 2 puffs into the lungs 2 times daily      albuterol (PROVENTIL) (2.5 MG/3ML) 0.083% nebulizer solution Take 3 mLs by nebulization every 4 hours as needed for Wheezing or Shortness of Breath (Cough) 120 each 3     No current facility-administered medications on file prior to encounter. REVIEW OF SYSTEMS    Pertinent items are noted in HPI. Constitutional: Negative for systemic symptoms including fever, chills and malaise. Objective:      /68   Pulse 66   Temp 98 °F (36.7 °C) (Temporal)   Resp 20     PHYSICAL EXAM      General: The patient is in no acute distress. Mental status:  Patient is appropriate, is  oriented to place and plan of care. Dermatologic exam: Visual inspection of the periwound reveals the skin to be moist.  Wound exam:  see wound description below     All active wounds listed below with today's date are evaluated      Wound 11/16/22 Finger (Comment which one) Anterior; Left #1 3rd finger (Active)   Wound Image   12/07/22 0841   Wound Etiology Traumatic 12/14/22 0830   Dressing Status New dressing applied 12/14/22 0904   Wound Cleansed Wound cleanser 12/14/22 0830   Offloading for Diabetic Foot Ulcers Offloading not required 12/14/22 0904   Wound Length (cm) 0.3 cm 12/14/22 0830   Wound Width (cm) 0.7 cm 12/14/22 0830   Wound Depth (cm) 0.1 cm 12/14/22 0830   Wound Surface Area (cm^2) 0.21 cm^2 12/14/22 0830   Wound Volume (cm^3) 0.021 cm^3 12/14/22 0830   Post-Procedure Length (cm) 0.7 cm 12/07/22 0901   Post-Procedure Width (cm) 0.7 cm 12/07/22 0901   Post-Procedure Depth (cm) 0.1 cm 12/07/22 0901   Post-Procedure Surface Area (cm^2) 0.49 cm^2 12/07/22 0901   Post-Procedure Volume (cm^3) 0.049 cm^3 12/07/22 0901   Distance Tunneling (cm) 0 cm 12/14/22 0830   Tunneling Position ___ O'Clock 0 12/14/22 0830   Undermining Starts ___ O'Clock 0 12/14/22 0830   Undermining Ends___ O'Clock 0 12/14/22 0830   Undermining Maxium Distance (cm) 0 12/14/22 0830   Wound Assessment Eschar dry 12/14/22 0830   Drainage Amount None 12/14/22 0830   Drainage Description Yellow;Serosanguinous 12/07/22 0841   Odor None 12/14/22 0830   Letty-wound Assessment Intact 12/14/22 0830   Margins Defined edges; Attached edges;Epibole (rolled edges) 12/14/22 0830   Wound Thickness Description not for Pressure Injury Full thickness 12/07/22 0841   Number of days: 28       Assessment:       Problem List Items Addressed This Visit       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 Plan:     Discharge Instructions         PHYSICIAN ORDERS AND DISCHARGE INSTRUCTIONS     Wound cleansing:              Do not scrub or use excessive force. Wash hands with soap and water before and after dressing changes. Prior to applying a clean dressing, cleanse wound with normal saline,               wound cleanser, or mild soap and water. Ask the physician or nurse before getting the wound(s) wet in a shower     Daily Wound management:             Keep weight off wounds and reposition every 2 hours. Avoid standing for long periods of time. Apply wraps/stockings in AM and remove at bedtime. If swelling is present, elevate legs to the level of the heart or above for              30 minutes 4-5 times a day and/or when sitting. When taking antibiotics take entire prescription as ordered by              physician do not stop taking until medicine is all gone.                             Grafts:                Wound Care Notes:           Consulting Dr Nova Hernandez to discuss options 12/14/22                           Orders for this week:12/14/22                 Left Hand Wounds:Wash with soap and water. Pat dry. Apply  joey  to wound bed (give remainder to pt)   Cover with xeroform and conform  Secure with coban   Wrap with 1 inch coban     Change every other day         Follow Up Instructions: At the [facility] in 3 week   Primary Wound Care Provider: Dr Ayla Baltazar   Call  for any questions or concerns. Central [phone] for imaging lab work        Treatment Note Wound 22 Finger (Comment which one) Anterior; Left #1 3rd finger-Dressing/Treatment:  (Joey, Xeroform, conform, Coban)    Written Patient Dismissal Instructions Given            Electronically signed by Bola Roman MD on 2022 at 9:15 AM

## 2022-12-14 NOTE — LETTER
[facility]  [address]  Phone: [phone]    Rebecca Newell MD        December 14, 2022     Patient: Tammie Malhotra   YOB: 2000   Date of Visit: 12/14/2022       To Whom It May Concern:    Bertin Holman was seen in the clinictoday from 8:00 to _______. It is my medical opinion that Bertin Holman {Work release (duty restriction):74351}. If you have any questions or concerns, please don't hesitate to call.     Sincerely,        Rebecca Newell MD

## 2023-01-04 ENCOUNTER — HOSPITAL ENCOUNTER (OUTPATIENT)
Dept: WOUND CARE | Age: 23
Discharge: HOME | End: 2023-01-04
Payer: COMMERCIAL

## 2023-01-04 DIAGNOSIS — S68.612A COMPLETE TRAUMATIC AMPUTATION OF RIGHT MIDDLE FINGER THROUGH PHALANX, INITIAL ENCOUNTER: Primary | ICD-10-CM

## 2023-01-04 DIAGNOSIS — S68.613D: ICD-10-CM

## 2023-01-04 PROBLEM — S68.613A: Status: ACTIVE | Noted: 2022-11-17

## 2023-01-04 PROCEDURE — 99212 OFFICE O/P EST SF 10 MIN: CPT

## 2023-01-04 RX ORDER — BACITRACIN ZINC AND POLYMYXIN B SULFATE 500; 1000 [USP'U]/G; [USP'U]/G
OINTMENT TOPICAL ONCE
OUTPATIENT
Start: 2023-01-04 | End: 2023-01-04

## 2023-01-04 RX ORDER — LIDOCAINE 40 MG/G
CREAM TOPICAL ONCE
OUTPATIENT
Start: 2023-01-04 | End: 2023-01-04

## 2023-01-04 RX ORDER — GINSENG 100 MG
CAPSULE ORAL ONCE
OUTPATIENT
Start: 2023-01-04 | End: 2023-01-04

## 2023-01-04 RX ORDER — GENTAMICIN SULFATE 1 MG/G
OINTMENT TOPICAL ONCE
OUTPATIENT
Start: 2023-01-04 | End: 2023-01-04

## 2023-01-04 RX ORDER — BACITRACIN, NEOMYCIN, POLYMYXIN B 400; 3.5; 5 [USP'U]/G; MG/G; [USP'U]/G
OINTMENT TOPICAL ONCE
OUTPATIENT
Start: 2023-01-04 | End: 2023-01-04

## 2023-01-04 RX ORDER — LIDOCAINE 50 MG/G
OINTMENT TOPICAL ONCE
OUTPATIENT
Start: 2023-01-04 | End: 2023-01-04

## 2023-01-04 RX ORDER — LIDOCAINE HYDROCHLORIDE 40 MG/ML
SOLUTION TOPICAL ONCE
OUTPATIENT
Start: 2023-01-04 | End: 2023-01-04

## 2023-01-04 NOTE — PROGRESS NOTES
Wound Care Center Progress Note       Emma Ellis  AGE: 25 y.o. GENDER: male  : 2000  TODAY'S DATE:  2023        Subjective:     No chief complaint on file. HISTORY of PRESENT ILLNESS     Emma Ellis is a 25 y.o. male who presents today for wound evaluation of Chronic traumatic ulcer(s) of left distal middle finger. The ulcer is of mild severity. The underlying cause of the wound is trauma. Completely healed. Still having neuropathic pain. Recommending PT as well to increase hand and finger strength, still having issues. Pt is concerned this may not get better regarding his neuropathy. We did discuss possible amputation of the distal finger if this becomes debilitating. 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 No pertinent family history. SOCIAL HISTORY    Social History     Tobacco Use    Smoking status: Never    Smokeless tobacco: Never    Tobacco comments:     Encouraged to stop vapping   Vaping Use    Vaping Use: Every day   Substance Use Topics    Alcohol use: Never     Comment: Caffeine: coffee (sometimes), energy drink (here and there), pop     Drug use: Never       ALLERGIES    Allergies   Allergen Reactions    Egg White (Egg Protein) Itching    Asmanex (120 Metered Doses) [Mometasone Furoate]      Patient reports this gives him hot flashes and dizziness.        MEDICATIONS    Current Outpatient Medications on File Prior to Encounter   Medication Sig Dispense Refill    naproxen (NAPROSYN) 500 MG tablet Take 1 tablet by mouth 2 times daily as needed for Pain 20 tablet 0    acetaminophen (AMINOFEN) 325 MG tablet Take 2 tablets by mouth every 6 hours as needed for Pain 20 tablet 0    albuterol sulfate  (90 Base) MCG/ACT inhaler Inhale 2 puffs into the lungs 4 times daily as needed      budesonide-formoterol (SYMBICORT) 160-4.5 MCG/ACT AERO Inhale 2 puffs into the lungs 2 times daily      tiotropium (SPIRIVA RESPIMAT) 2.5 MCG/ACT AERS inhaler Inhale 2 puffs into the lungs 2 times daily      albuterol (PROVENTIL) (2.5 MG/3ML) 0.083% nebulizer solution Take 3 mLs by nebulization every 4 hours as needed for Wheezing or Shortness of Breath (Cough) 120 each 3     No current facility-administered medications on file prior to encounter.       REVIEW OF SYSTEMS    Pertinent items are noted in HPI.    Constitutional: Negative for systemic symptoms including fever, chills and malaise.    Objective:      There were no vitals taken for this visit.    PHYSICAL EXAM      General: The patient is in no acute distress.    Mental status:  Patient is appropriate, is  oriented to place and plan of care.  Dermatologic exam: Visual inspection of the periwound reveals the skin to be normal in turgor and texture.  Wound exam:  see wound description below     All active wounds listed below with today's date are evaluated      Wound 11/16/22 Finger (Comment which one) Anterior;Left #1 3rd finger (Active)   Wound Image   12/07/22 0841   Wound Etiology Traumatic 01/04/23 0840   Dressing Status New dressing applied 12/14/22 0904   Wound Cleansed Wound cleanser 01/04/23 0840   Offloading for Diabetic Foot Ulcers Offloading not required 01/04/23 0840   Wound Length (cm) 0 cm 01/04/23 0840   Wound Width (cm) 0 cm 01/04/23 0840   Wound Depth (cm) 0 cm 01/04/23 0840   Wound Surface Area (cm^2) 0 cm^2 01/04/23 0840   Wound Volume (cm^3) 0 cm^3 01/04/23 0840   Post-Procedure Length (cm) 0.7 cm 12/07/22 0901   Post-Procedure Width (cm) 0.7 cm 12/07/22 0901  Post-Procedure Depth (cm) 0.1 cm 12/07/22 0901   Post-Procedure Surface Area (cm^2) 0.49 cm^2 12/07/22 0901   Post-Procedure Volume (cm^3) 0.049 cm^3 12/07/22 0901   Distance Tunneling (cm) 0 cm 01/04/23 0840   Tunneling Position ___ O'Clock 0 01/04/23 0840   Undermining Starts ___ O'Clock 0 01/04/23 0840   Undermining Ends___ O'Clock 0 01/04/23 0840   Undermining Maxium Distance (cm) 0 01/04/23 0840   Wound Assessment Eschar dry 12/14/22 0830   Drainage Amount None 01/04/23 0840   Drainage Description Yellow;Serosanguinous 12/07/22 0841   Odor None 01/04/23 0840   Letty-wound Assessment Intact 01/04/23 0840   Margins Attached edges 01/04/23 0840   Wound Thickness Description not for Pressure Injury Full thickness 12/07/22 0841   Number of days: 49       Assessment:       Problem List Items Addressed This Visit       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 Plan:     Discharge Instructions         PHYSICIAN ORDERS AND DISCHARGE INSTRUCTIONS     Wound cleansing:              Do not scrub or use excessive force. Wash hands with soap and water before and after dressing changes. Prior to applying a clean dressing, cleanse wound with normal saline,               wound cleanser, or mild soap and water. Ask the physician or nurse before getting the wound(s) wet in a shower     Daily Wound management:             Keep weight off wounds and reposition every 2 hours. Avoid standing for long periods of time. Apply wraps/stockings in AM and remove at bedtime. If swelling is present, elevate legs to the level of the heart or above for              30 minutes 4-5 times a day and/or when sitting.                                    When taking antibiotics take entire prescription as ordered by              physician do not stop taking until medicine is all gone. Grafts:                Wound Care Notes:           Consulting Dr Brigid Hawkins to discuss options 22                           Orders for this week:23                 Left Hand Wounds: HEALED        Follow Up Instructions: Discharged  Primary Wound Care Provider: Dr Curry Morrow   Call  for any questions or concerns.   Central [phone] for imaging lab work        Treatment Note      Written Patient Dismissal Instructions Given            Electronically signed by Nai Castanon MD on 2023 at 10:36 AM

## 2023-01-04 NOTE — DISCHARGE INSTRUCTIONS
PHYSICIAN ORDERS AND DISCHARGE INSTRUCTIONS     Wound cleansing:              Do not scrub or use excessive force. Wash hands with soap and water before and after dressing changes. Prior to applying a clean dressing, cleanse wound with normal saline,               wound cleanser, or mild soap and water. Ask the physician or nurse before getting the wound(s) wet in a shower     Daily Wound management:             Keep weight off wounds and reposition every 2 hours. Avoid standing for long periods of time. Apply wraps/stockings in AM and remove at bedtime. If swelling is present, elevate legs to the level of the heart or above for              30 minutes 4-5 times a day and/or when sitting. When taking antibiotics take entire prescription as ordered by              physician do not stop taking until medicine is all gone. Grafts:                Wound Care Notes:           Consulting Dr Hartmann Shown to discuss options 22                           Orders for this week:23                 Left Hand Wounds: HEALED        Follow Up Instructions: Discharged  Primary Wound Care Provider: Dr Bernstein Seat   Call  for any questions or concerns.   Central [phone] for imaging lab work

## 2023-01-04 NOTE — LETTER
WORK RELEASE  [address] [facility]  [address] [address]  [phone]  Dept: [address] [facility] RECORD NUMBER: [id number]   AGE: 25 y.o. GENDER: male : 2000   TODAYS DATE: 2023       Mr. White was seen in the [address] on 2023 and left at 0920    May return to work on 23    Electronically signed by Miquel Burns RN on 2023 at 9:00 AM    Sincerely,      [address] [place] [address] Pkwy and [facility]

## 2023-02-09 ENCOUNTER — TELEPHONE (OUTPATIENT)
Dept: WOUND CARE | Age: 23
End: 2023-02-09

## 2023-03-03 LAB
ALBUMIN SERPL-MCNC: 4.7 G/DL
ALP BLD-CCNC: 74 U/L
ALT SERPL-CCNC: 16 U/L
ANION GAP SERPL CALCULATED.3IONS-SCNC: 1.6 MMOL/L
AST SERPL-CCNC: 20 U/L
BASOPHILS ABSOLUTE: 0.1 /ΜL
BASOPHILS RELATIVE PERCENT: 1.4 %
BILIRUB SERPL-MCNC: 0.6 MG/DL (ref 0.1–1.4)
BUN BLDV-MCNC: 17 MG/DL
CALCIUM SERPL-MCNC: 10.5 MG/DL
CHLORIDE BLD-SCNC: 97 MMOL/L
CO2: 24 MMOL/L
CREAT SERPL-MCNC: 1 MG/DL
EGFR: 110
EOSINOPHILS ABSOLUTE: 0.5 /ΜL
EOSINOPHILS RELATIVE PERCENT: 9.9 %
GLUCOSE BLD-MCNC: 73 MG/DL
HCT VFR BLD CALC: 45.3 % (ref 41–53)
HEMOGLOBIN: 15.6 G/DL (ref 13.5–17.5)
LYMPHOCYTES ABSOLUTE: 1.3 /ΜL
LYMPHOCYTES RELATIVE PERCENT: 27 %
MCH RBC QN AUTO: 29.4 PG
MCHC RBC AUTO-ENTMCNC: 34.4 G/DL
MCV RBC AUTO: 85.5 FL
MONOCYTES ABSOLUTE: 0.4 /ΜL
MONOCYTES RELATIVE PERCENT: 8.2 %
NEUTROPHILS ABSOLUTE: 2.7 /ΜL
NEUTROPHILS RELATIVE PERCENT: 53.3 %
PDW BLD-RTO: 12.4 %
PLATELET # BLD: 278 K/ΜL
PMV BLD AUTO: 10.6 FL
POTASSIUM SERPL-SCNC: 4.9 MMOL/L
RBC # BLD: 5.3 10^6/ΜL
SODIUM BLD-SCNC: 134 MMOL/L
TOTAL PROTEIN: 7.6
WBC # BLD: 5 10^3/ML

## 2023-03-07 ENCOUNTER — OFFICE VISIT (OUTPATIENT)
Dept: CARDIOLOGY CLINIC | Age: 23
End: 2023-03-07
Payer: COMMERCIAL

## 2023-03-07 VITALS — HEART RATE: 60 BPM | DIASTOLIC BLOOD PRESSURE: 60 MMHG | SYSTOLIC BLOOD PRESSURE: 110 MMHG

## 2023-03-07 DIAGNOSIS — R55 SYNCOPE, UNSPECIFIED SYNCOPE TYPE: ICD-10-CM

## 2023-03-07 PROCEDURE — 99212 OFFICE O/P EST SF 10 MIN: CPT | Performed by: NURSE PRACTITIONER

## 2023-03-07 PROCEDURE — 93000 ELECTROCARDIOGRAM COMPLETE: CPT | Performed by: NURSE PRACTITIONER

## 2023-03-07 ASSESSMENT — ENCOUNTER SYMPTOMS
ORTHOPNEA: 0
SHORTNESS OF BREATH: 0

## 2023-03-07 NOTE — Clinical Note
March 7, 2023       Shira Diaz YOB: 2000   [address] [facility] Date of Visit:  3/7/2023       To Whom It May Concern: It is my medical opinion that Jorge L Cesar {Work release (duty restriction):89264}. If you have any questions or concerns, please don't hesitate to call.     Sincerely,        Rajeev Regalado, APRN - CNP

## 2023-03-07 NOTE — LETTER
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 3/7/2023.      Thank you     Semaj Garcia

## 2023-03-07 NOTE — LETTER
[address] [address]  Phone: [phone]  Fax: [phone]           Jorge L Cesar was seen in our office today 3/7/2023      If you have any questions please give us a call [phone]          Armando Campbell [facility]

## 2023-03-07 NOTE — Clinical Note
March 7, 2023       Jaelyn Drew YOB: 2000   [address] [facility] Date of Visit:  3/7/2023       To Whom It May Concern:    Edwar Oh was seen in my clinic on 3/7/2023. He {Return to school/sport/work:18114}. If you have any questions or concerns, please don't hesitate to call.     Sincerely,        Kelsey Hayes, SUSAN - CNP

## 2023-03-07 NOTE — PROGRESS NOTES
3/7/2023  Primary cardiologist: Dr. Immanuel Clavin:   Clement Shi  is an established 25 y.o.  male here for a 12 month follow up on CP      SUBJECTIVE/OBJECTIVE:  Clement Shi is a 25 y.o. male with a history of chest pain, asthma    In April of 2022 Clement Shi underwent cardiac CTA that showed no significant CAD with normal left ventricular systolic function with EF 69%. HPI :   Clement Shi reports he has noted headaches. Was seen in urgent care this past week and reports that he had a significantly elevated blood pressure. He states he had a syncopal episode while in urgent care- blood pressure was elevated then dropped down. He was advised to go to the ED however he declined. He continues with intermittent headaches when his blood pressure is elevated. He is checking blood his pressure at home on occasion: mostly when he has a headache and notes it is high at that time. Review of Systems   Constitutional: Negative for diaphoresis and malaise/fatigue. Cardiovascular:  Positive for near-syncope and syncope. Negative for chest pain, claudication, dyspnea on exertion, irregular heartbeat, leg swelling, orthopnea, palpitations and paroxysmal nocturnal dyspnea. Respiratory:  Negative for shortness of breath. Neurological:  Positive for headaches. Negative for dizziness and light-headedness. Vitals:    03/07/23 1555 03/07/23 1605 03/07/23 1606 03/07/23 1607   BP: (!) 92/46 (!) 102/40 (!) 102/58 110/60   Site: Right Lower Arm Left Upper Arm Left Upper Arm Left Upper Arm   Position: Sitting Supine Sitting Standing   Cuff Size: Medium Adult Medium Adult Medium Adult Medium Adult   Pulse: 60        No flowsheet data found. Wt Readings from Last 3 Encounters:   03/01/22 131 lb (59.4 kg)   08/20/21 135 lb (61.2 kg)   08/18/21 135 lb (61.2 kg)     There is no height or weight on file to calculate BMI. Physical Exam  HENT:      Head: Normocephalic and atraumatic.       Mouth/Throat:      Mouth: Mucous membranes are moist. Eyes:      Extraocular Movements: Extraocular movements intact. Pupils: Pupils are equal, round, and reactive to light. Neck:      Vascular: No carotid bruit. Cardiovascular:      Rate and Rhythm: Regular rhythm. Bradycardia present. Pulses: Normal pulses. Pulmonary:      Effort: Pulmonary effort is normal.      Breath sounds: Normal breath sounds. Abdominal:      General: There is no distension. Palpations: Abdomen is soft. Tenderness: There is no abdominal tenderness. Musculoskeletal:         General: Normal range of motion. Cervical back: No tenderness. Right lower leg: No edema. Left lower leg: No edema. Skin:     General: Skin is warm and dry. Capillary Refill: Capillary refill takes less than 2 seconds. Neurological:      General: No focal deficit present. Mental Status: He is alert and oriented to person, place, and time. Psychiatric:         Mood and Affect: Mood normal.         Behavior: Behavior normal.              Current Outpatient Medications   Medication Sig Dispense Refill    acetaminophen (AMINOFEN) 325 MG tablet Take 2 tablets by mouth every 6 hours as needed for Pain 20 tablet 0    albuterol sulfate  (90 Base) MCG/ACT inhaler Inhale 2 puffs into the lungs 4 times daily as needed      budesonide-formoterol (SYMBICORT) 160-4.5 MCG/ACT AERO Inhale 2 puffs into the lungs 2 times daily      tiotropium (SPIRIVA RESPIMAT) 2.5 MCG/ACT AERS inhaler Inhale 2 puffs into the lungs 2 times daily      albuterol (PROVENTIL) (2.5 MG/3ML) 0.083% nebulizer solution Take 3 mLs by nebulization every 4 hours as needed for Wheezing or Shortness of Breath (Cough) 120 each 3    sertraline (ZOLOFT) 50 MG tablet Take 50 mg by mouth daily      naproxen (NAPROSYN) 500 MG tablet Take 1 tablet by mouth 2 times daily as needed for Pain 20 tablet 0     No current facility-administered medications for this visit.           All pertinent data reviewed and discussed with patient       ASSESSMENT/PLAN:    Syncope  Reports status syncopal episode while in urgent care the other day. Reports his blood pressure was elevated and then dropped down. We will request copy of records from urgent care to distinguish what blood pressures were. May need to do tilt table test however we will wait for records from urgent care. I have advised him to keep himself well-hydrated and use of compression socks as well. Possibility that he may be having episodes of orthostatic hypotension. Need to consider tilt table test as well. Reports he had labs done his primary care this week as well. We will request copy of labs as well. EKG sinus bradycardia with no acute ST or T wave abnormalities. When compared to previous EKG no significant changes are noted. He denies recreational drug use    Tests ordered:  none   Follow-up  3mo or sooner if needed      Signed:  SUSAN Pedraza CNP, 3/7/2023, 4:20 PM    An electronic signature was used to authenticate this note. Please note this report has been partially produced using speech recognition software and may contain errors related to that system including errors in grammar, punctuation, and spelling, as well as words and phrases that may be inappropriate. If there are any questions or concerns please feel free to contact the dictating provider for clarification.

## 2023-03-10 ENCOUNTER — CLINICAL DOCUMENTATION (OUTPATIENT)
Dept: CARDIOLOGY CLINIC | Age: 23
End: 2023-03-10

## 2023-03-13 RX ORDER — MIDODRINE HYDROCHLORIDE 2.5 MG/1
2.5 TABLET ORAL 2 TIMES DAILY
Qty: 60 TABLET | Refills: 5 | Status: SHIPPED | OUTPATIENT
Start: 2023-03-13

## 2023-03-13 NOTE — TELEPHONE ENCOUNTER
Spoke with grandmother Cathy Simons, patient reported to her last night B/P67/29, HR 53, recheck approx 15 minutes later 95/46 HR 68. States when active he has no symptoms and B/P good, when sitting or resting he is dizzy and B/P drops.

## 2023-03-13 NOTE — TELEPHONE ENCOUNTER
Grandmother called grandson called midnight   Stated that his bp was 67/29 had tingling in both arms , went to work cant afford to loose his job Aniyah had discussed a EMMETT

## 2023-03-22 ENCOUNTER — TELEPHONE (OUTPATIENT)
Dept: CARDIOLOGY CLINIC | Age: 23
End: 2023-03-22

## 2023-03-22 NOTE — TELEPHONE ENCOUNTER
Returned call to patient, he now states he has not been taking midodrine. Patient called off work today and would like a letter from our office.  Per Teagan Valdes NP cannot give letter for missed work

## 2023-03-22 NOTE — TELEPHONE ENCOUNTER
Patient started on midodrine, states 20-30 minutes after each dose he get pain in chest that radiates to neck and arms.  Since starting B/P 140-150 when active, 120-130 at rest

## 2023-07-18 ENCOUNTER — OFFICE VISIT (OUTPATIENT)
Dept: CARDIOLOGY CLINIC | Age: 23
End: 2023-07-18
Payer: COMMERCIAL

## 2023-07-18 VITALS
HEART RATE: 70 BPM | BODY MASS INDEX: 19.85 KG/M2 | SYSTOLIC BLOOD PRESSURE: 110 MMHG | OXYGEN SATURATION: 97 % | DIASTOLIC BLOOD PRESSURE: 76 MMHG | HEIGHT: 68 IN | WEIGHT: 131 LBS

## 2023-07-18 DIAGNOSIS — R00.1 BRADYCARDIA: Primary | ICD-10-CM

## 2023-07-18 PROCEDURE — 99213 OFFICE O/P EST LOW 20 MIN: CPT | Performed by: INTERNAL MEDICINE

## 2023-07-18 NOTE — PROGRESS NOTES
CARDIOLOGY NOTE      7/18/2023    RE: Todd Avelar  (2000)                               TO:  Dr. Johnie Almeida, APRN - NP            Echo Del Rosario is a 26 y.o. male who was seen today for management of chest pain                                    HPI:                   Pt has h/o chest pain, seen today for follow-up. 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    Bradycardia 07/29/2021     Current Outpatient Medications   Medication Sig Dispense Refill    naproxen (NAPROSYN) 500 MG tablet Take 1 tablet by mouth 2 times daily as needed for Pain 20 tablet 0    acetaminophen (AMINOFEN) 325 MG tablet Take 2 tablets by mouth every 6 hours as needed for Pain 20 tablet 0    albuterol sulfate  (90 Base) MCG/ACT inhaler Inhale 2 puffs into the lungs 4 times daily as needed      budesonide-formoterol (SYMBICORT) 160-4.5 MCG/ACT AERO Inhale 2 puffs into the lungs 2 times daily      tiotropium (SPIRIVA RESPIMAT) 2.5 MCG/ACT AERS inhaler Inhale 2 puffs into the lungs in the morning and 2 puffs in the evening. albuterol (PROVENTIL) (2.5 MG/3ML) 0.083% nebulizer solution Take 3 mLs by nebulization every 4 hours as needed for Wheezing or Shortness of Breath (Cough) 120 each 3    midodrine (PROAMATINE) 2.5 MG tablet Take 1 tablet by mouth 2 times daily (Patient not taking: Reported on 7/18/2023) 60 tablet 5    sertraline (ZOLOFT) 50 MG tablet Take 50 mg by mouth daily (Patient not taking: Reported on 7/18/2023)       No current facility-administered medications for this visit.      Allergies: Egg white (egg protein) and Asmanex (120 metered doses) [mometasone furoate]  Past Medical History:   Diagnosis Date    Asthma

## 2023-08-16 NOTE — ED PROVIDER NOTES
EMERGENCY DEPARTMENT ENCOUNTER        PCP: No primary care provider on file. CHIEF COMPLAINT    Chief Complaint   Patient presents with    Chest Pain     dizziness             HPI      Francisca Fajardo is a 22 y.o. male who presents with chest pain. The patient states he has had intermittent chest pains for the last several months. States he followed up with his primary care provider who states he had symptoms of hypertension and was supposed to return for a follow-up in November. Patient states this exacerbation began last night was intermittent, however today had been more frequent. He states the pain is moderate in intensity, pressure-like, and last only a couple seconds. He states associated shortness of breath and nausea. Nothing alleviates or exacerbates his symptoms. He is not taking any medication. He denies any fevers, recent periods of immobilization, hemoptysis, unilateral leg pain or swelling, malignancy, blood or clotting disorders, or other complaints. REVIEW OF SYSTEMS    Constitutional:  Denies fever, chills. HENT:  Denies sore throat or ear pain   Cardiovascular:  +Chest Pain,  Denies palpitations,  Denies syncope, no extremity edema. See HPI  Respiratory:    See HPI  GI:  Denies abdominal pain, nausea, vomiting, or diarrhea  :  Denies any urinary symptoms  Musculoskeletal:  Denies back pain, no extremity pain, swelling or discoloration.   No pale or cold extremity  Skin:  Denies rash  Neurologic:  Denies changes in vision,  lightheadedness, dizziness, headache, focal weakness or sensory changes   Endocrine:  Denies polyuria or polydypsia   Lymphatic:  Denies swollen glands     All other review of systems are negative  See HPI and nursing notes for additional information         PAST MEDICAL & SURGICAL HISTORY    Past Medical History:   Diagnosis Date    Asthma      Past Surgical History:   Procedure Laterality Date    KNEE SURGERY Left 2019       CURRENT MEDICATIONS Current Outpatient Rx   Medication Sig Dispense Refill    naproxen (NAPROSYN) 500 MG tablet Take 1 tablet by mouth 2 times daily as needed for Pain 20 tablet 0    baclofen (LIORESAL) 10 MG tablet Take 1 tablet by mouth 3 times daily (Patient not taking: Reported on 7/29/2021) 20 tablet 0    meloxicam (MOBIC) 15 MG tablet Take 1 tablet by mouth daily (Patient not taking: Reported on 7/29/2021) 90 tablet 1    acetaminophen (AMINOFEN) 325 MG tablet Take 2 tablets by mouth every 6 hours as needed for Pain 20 tablet 0    ondansetron (ZOFRAN ODT) 4 MG disintegrating tablet Take 1 tablet by mouth every 8 hours as needed for Nausea 15 tablet 0    albuterol sulfate  (90 Base) MCG/ACT inhaler Inhale 2 puffs into the lungs 4 times daily as needed      budesonide-formoterol (SYMBICORT) 160-4.5 MCG/ACT AERO Inhale 2 puffs into the lungs 2 times daily      tiotropium (SPIRIVA RESPIMAT) 2.5 MCG/ACT AERS inhaler Inhale 2 puffs into the lungs 2 times daily      montelukast (SINGULAIR) 10 MG tablet Take 10 mg by mouth nightly (Patient not taking: Reported on 7/29/2021)      dupilumab (DUPIXENT) 300 MG/2ML SOSY injection Inject 300 mg into the skin every 14 days      fluticasone-salmeterol (ADVAIR DISKUS) 100-50 MCG/DOSE diskus inhaler Inhale 1 puff into the lungs every 12 hours      predniSONE (DELTASONE) 5 MG tablet Take 5 mg by mouth daily      albuterol (PROVENTIL) (2.5 MG/3ML) 0.083% nebulizer solution Take 3 mLs by nebulization every 4 hours as needed for Wheezing or Shortness of Breath (Cough) 120 each 3       ALLERGIES    Allergies   Allergen Reactions    Asmanex (120 Metered Doses) [Mometasone Furoate]      Patient reports this gives him hot flashes and dizziness.        SOCIAL & FAMILY HISTORY    Social History     Socioeconomic History    Marital status: Single     Spouse name: Not on file    Number of children: Not on file    Years of education: Not on file    Highest education level: Not on file Occupational History    Not on file   Tobacco Use    Smoking status: Never Smoker    Smokeless tobacco: Never Used   Substance and Sexual Activity    Alcohol use: Never    Drug use: Never    Sexual activity: Not on file   Other Topics Concern    Not on file   Social History Narrative    ** Merged History Encounter **          Social Determinants of Health     Financial Resource Strain:     Difficulty of Paying Living Expenses:    Food Insecurity:     Worried About Running Out of Food in the Last Year:     Ran Out of Food in the Last Year:    Transportation Needs:     Lack of Transportation (Medical):  Lack of Transportation (Non-Medical):    Physical Activity:     Days of Exercise per Week:     Minutes of Exercise per Session:    Stress:     Feeling of Stress :    Social Connections:     Frequency of Communication with Friends and Family:     Frequency of Social Gatherings with Friends and Family:     Attends Rastafarian Services:     Active Member of Clubs or Organizations:     Attends Club or Organization Meetings:     Marital Status:    Intimate Partner Violence:     Fear of Current or Ex-Partner:     Emotionally Abused:     Physically Abused:     Sexually Abused:      No family history on file. PHYSICAL EXAM    VITAL SIGNS: /70   Pulse 54   Temp 98.1 °F (36.7 °C) (Oral)   Resp 17   Ht 5' 7\" (1.702 m)   Wt 135 lb (61.2 kg)   SpO2 100%   BMI 21.14 kg/m²    Constitutional:  Well developed, well nourished, no acute distress   HENT:  Atraumatic, moist mucus membranes  Neck/Lymphatics: supple, no JVD, no swollen nodes  Respiratory:  Nonlabored breathing. Lungs Clear, no retractions. Cardiovascular:  RRR, no murmurs/rubs/gallops. No JVD  Vascular:   Distal pulses palpable and reveal no discernible inequality  GI:  Soft, nontender, normal bowel sounds  Musculoskeletal:    There is no edema, asymmetry, or calf / thigh tenderness bilaterally. No cyanosis.     No cool or pale-appearing limb.   Distal cap refill and pulses intact bilateral upper and lower extremities  Bilateral upper and lower extremity ROM intact without pain or obvious deficit    Integument:  Skin warm and dry, no petechiae   Neurologic:  Alert & oriented, normal speech    - Alert & oriented person, place, time, and situation, no speech difficulties or slurring.  - No obvious gross motor deficits  - Cranial nerves 2-12 grossly intact  - Negative meningeal signs.  - Sensation intact to light touch  - Strength 5/5 in upper and lower extremities bilaterally  - No truncal ataxia    Psych: Pleasant affect, no hallucinations          LABS:  Results for orders placed or performed during the hospital encounter of 08/18/21   CBC Auto Differential   Result Value Ref Range    WBC 5.2 4.0 - 10.5 K/CU MM    RBC 5.06 4.6 - 6.2 M/CU MM    Hemoglobin 14.6 13.5 - 18.0 GM/DL    Hematocrit 46.8 42 - 52 %    MCV 92.5 78 - 100 FL    MCH 28.9 27 - 31 PG    MCHC 31.2 (L) 32.0 - 36.0 %    RDW 13.2 11.7 - 14.9 %    Platelets 692 790 - 792 K/CU MM    MPV 11.2 (H) 7.5 - 11.1 FL    Differential Type AUTOMATED DIFFERENTIAL     Segs Relative 43.9 36 - 66 %    Lymphocytes % 37.4 24 - 44 %    Monocytes % 10.5 (H) 0 - 4 %    Eosinophils % 6.7 (H) 0 - 3 %    Basophils % 1.1 (H) 0 - 1 %    Segs Absolute 2.3 K/CU MM    Lymphocytes Absolute 2.0 K/CU MM    Monocytes Absolute 0.6 K/CU MM    Eosinophils Absolute 0.4 K/CU MM    Basophils Absolute 0.1 K/CU MM    Nucleated RBC % 0.0 %    Total Nucleated RBC 0.0 K/CU MM    Total Immature Neutrophil 0.02 K/CU MM    Immature Neutrophil % 0.4 0 - 0.43 %   Comprehensive Metabolic Panel w/ Reflex to MG   Result Value Ref Range    Sodium 138 135 - 145 MMOL/L    Potassium 4.6 3.5 - 5.1 MMOL/L    Chloride 104 99 - 110 mMol/L    CO2 27 21 - 32 MMOL/L    BUN 9 6 - 23 MG/DL    CREATININE 0.8 (L) 0.9 - 1.3 MG/DL    Glucose 93 70 - 99 MG/DL    Calcium 9.7 8.3 - 10.6 MG/DL    Albumin 4.4 3.4 - 5.0 GM/DL    Total Protein 7.1 6.4 - 8.2 GM/DL    Total Bilirubin 0.5 0.0 - 1.0 MG/DL    ALT 18 10 - 40 U/L    AST 19 15 - 37 IU/L    Alkaline Phosphatase 82 40 - 128 IU/L    GFR Non-African American >60 >60 mL/min/1.73m2    GFR African American >60 >60 mL/min/1.73m2    Anion Gap 7 4 - 16   Troponin   Result Value Ref Range    Troponin T <0.010 <0.01 NG/ML   Troponin   Result Value Ref Range    Troponin T <0.010 <0.01 NG/ML   EKG 12 Lead   Result Value Ref Range    Ventricular Rate 53 BPM    Atrial Rate 53 BPM    P-R Interval 184 ms    QRS Duration 88 ms    Q-T Interval 382 ms    QTc Calculation (Bazett) 358 ms    P Axis 57 degrees    R Axis 92 degrees    T Axis 75 degrees    Diagnosis       Sinus bradycardia with sinus arrhythmia  Rightward axis  ST elevation, consider early repolarization, pericarditis, or injury  Abnormal ECG  No previous ECGs available           EKG Interpretation  Please see ED physician's note for EKG interpretation        RADIOLOGY/PROCEDURES    XR CHEST (2 VW)   Final Result   No acute process. ED COURSE & MEDICAL DECISION MAKING      Patient brought to room and placed on monitor. Records were reviewed and the patient has been following up with Dr. Savage Cary. He did get the results of a recent Holter monitor this morning which showed predominant sinus rhythm with no ectopy. EKG was obtained on arrival and showed early repolarization. He was medicated with aspirin 324 mg by mouth. Chest x-ray showed no acute findings. CBC did not show leukocytosis or anemia. CMP did not show any severe electrolyte derangement. Troponin x2 -. Exact etiology of patient's symptoms unknown at this time. I considered pulmonary embolism as the cause of symptoms today. Using risk stratification tools today (see note above for details), Pt was found low risk for PE. I consulted with Dr. Koreen Sandifer, he states to have the patient call the office to schedule a follow-up appointment.     All pertinent Lab data and [Fully active, able to carry on all pre-disease performance without restriction] : Status 0 - Fully active, able to carry on all pre-disease performance without restriction [Thin] : thin [Normal] : affect appropriate [de-identified] : non-toxic appearing [de-identified] : soft, NT without palpable hepatosplenomegaly [de-identified] : no gross focal motor deficits

## 2024-09-18 ENCOUNTER — TELEPHONE (OUTPATIENT)
Dept: CARDIOLOGY CLINIC | Age: 24
End: 2024-09-18

## 2024-09-19 ENCOUNTER — OFFICE VISIT (OUTPATIENT)
Dept: CARDIOLOGY CLINIC | Age: 24
End: 2024-09-19
Payer: COMMERCIAL

## 2024-09-19 VITALS
WEIGHT: 137.2 LBS | OXYGEN SATURATION: 99 % | BODY MASS INDEX: 19.64 KG/M2 | DIASTOLIC BLOOD PRESSURE: 64 MMHG | HEART RATE: 60 BPM | HEIGHT: 70 IN | SYSTOLIC BLOOD PRESSURE: 122 MMHG

## 2024-09-19 DIAGNOSIS — I95.1 ORTHOSTATIC HYPOTENSION: ICD-10-CM

## 2024-09-19 DIAGNOSIS — R00.1 BRADYCARDIA: Primary | ICD-10-CM

## 2024-09-19 PROCEDURE — 99212 OFFICE O/P EST SF 10 MIN: CPT | Performed by: NURSE PRACTITIONER

## 2024-09-19 PROCEDURE — 93000 ELECTROCARDIOGRAM COMPLETE: CPT | Performed by: NURSE PRACTITIONER

## 2024-09-19 ASSESSMENT — ENCOUNTER SYMPTOMS
SHORTNESS OF BREATH: 0
ORTHOPNEA: 0

## 2025-03-30 ENCOUNTER — APPOINTMENT (OUTPATIENT)
Dept: GENERAL RADIOLOGY | Age: 25
End: 2025-03-30
Payer: COMMERCIAL

## 2025-03-30 ENCOUNTER — HOSPITAL ENCOUNTER (EMERGENCY)
Age: 25
Discharge: HOME | End: 2025-03-30
Payer: COMMERCIAL

## 2025-03-30 VITALS
TEMPERATURE: 99.5 F | DIASTOLIC BLOOD PRESSURE: 64 MMHG | RESPIRATION RATE: 10 BRPM | HEART RATE: 115 BPM | SYSTOLIC BLOOD PRESSURE: 124 MMHG | OXYGEN SATURATION: 95 %

## 2025-03-30 DIAGNOSIS — J06.9 ACUTE UPPER RESPIRATORY INFECTION: ICD-10-CM

## 2025-03-30 DIAGNOSIS — J45.901 EXACERBATION OF ASTHMA, UNSPECIFIED ASTHMA SEVERITY, UNSPECIFIED WHETHER PERSISTENT: Primary | ICD-10-CM

## 2025-03-30 LAB
INFLUENZA A BY PCR: NOT DETECTED
INFLUENZA B BY PCR: NOT DETECTED
SARS-COV-2 RDRP RESP QL NAA+PROBE: NOT DETECTED
SPECIMEN DESCRIPTION: NORMAL

## 2025-03-30 PROCEDURE — 94640 AIRWAY INHALATION TREATMENT: CPT

## 2025-03-30 PROCEDURE — 99284 EMERGENCY DEPT VISIT MOD MDM: CPT

## 2025-03-30 PROCEDURE — 6370000000 HC RX 637 (ALT 250 FOR IP): Performed by: PHYSICIAN ASSISTANT

## 2025-03-30 PROCEDURE — 71045 X-RAY EXAM CHEST 1 VIEW: CPT

## 2025-03-30 PROCEDURE — 87635 SARS-COV-2 COVID-19 AMP PRB: CPT

## 2025-03-30 PROCEDURE — 87502 INFLUENZA DNA AMP PROBE: CPT

## 2025-03-30 RX ORDER — PREDNISONE 20 MG/1
60 TABLET ORAL DAILY
Qty: 12 TABLET | Refills: 0 | Status: SHIPPED | OUTPATIENT
Start: 2025-03-30 | End: 2025-04-03

## 2025-03-30 RX ORDER — ACETAMINOPHEN 500 MG
1000 TABLET ORAL
Status: COMPLETED | OUTPATIENT
Start: 2025-03-30 | End: 2025-03-30

## 2025-03-30 RX ORDER — IPRATROPIUM BROMIDE AND ALBUTEROL SULFATE 2.5; .5 MG/3ML; MG/3ML
1 SOLUTION RESPIRATORY (INHALATION) ONCE
Status: COMPLETED | OUTPATIENT
Start: 2025-03-30 | End: 2025-03-30

## 2025-03-30 RX ORDER — DOXYCYCLINE HYCLATE 100 MG
100 TABLET ORAL 2 TIMES DAILY
Qty: 14 TABLET | Refills: 0 | Status: SHIPPED | OUTPATIENT
Start: 2025-03-30 | End: 2025-04-06

## 2025-03-30 RX ADMIN — IPRATROPIUM BROMIDE AND ALBUTEROL SULFATE 1 DOSE: .5; 2.5 SOLUTION RESPIRATORY (INHALATION) at 14:37

## 2025-03-30 RX ADMIN — ACETAMINOPHEN 1000 MG: 500 TABLET ORAL at 14:50

## 2025-03-30 ASSESSMENT — PAIN SCALES - GENERAL
PAINLEVEL_OUTOF10: 1
PAINLEVEL_OUTOF10: 1

## 2025-03-30 ASSESSMENT — LIFESTYLE VARIABLES
HOW MANY STANDARD DRINKS CONTAINING ALCOHOL DO YOU HAVE ON A TYPICAL DAY: PATIENT DOES NOT DRINK
HOW OFTEN DO YOU HAVE A DRINK CONTAINING ALCOHOL: NEVER

## 2025-03-30 ASSESSMENT — PAIN - FUNCTIONAL ASSESSMENT
PAIN_FUNCTIONAL_ASSESSMENT: NONE - DENIES PAIN
PAIN_FUNCTIONAL_ASSESSMENT: 0-10

## 2025-03-30 ASSESSMENT — PAIN DESCRIPTION - LOCATION
LOCATION: HEAD
LOCATION: HEAD

## 2025-03-30 ASSESSMENT — PAIN DESCRIPTION - DESCRIPTORS
DESCRIPTORS: ACHING
DESCRIPTORS: ACHING

## 2025-03-30 NOTE — ED PROVIDER NOTES
[facility] EMERGENCY DEPARTMENT  EMERGENCY DEPARTMENT ENCOUNTER        Pt Name: Andrés Franco  MRN: [id number]  Birthdate 2000  Date of evaluation: 3/30/2025  Provider: Scout Alberts PA-C  PCP: [place] Ballesteros, SUSAN - CNP  Note Started: 2:28 PM EDT 3/30/25      MER. I have evaluated this patient.        CHIEF COMPLAINT       Chief Complaint   Patient presents with    Shortness of Breath     Cough, congestion, body aches, shortness of breath with history of asthma, and headache since Thursday coming from home. [facility] gave the patient 125 of solumedrol and a breathing treatment on the way here.        HISTORY OF PRESENT ILLNESS: 1 or more Elements     History From: patient    Limitations to history : None    Social Determinants Significantly Affecting Health : None    Chief Complaint: Shortness of breath, asthma exacerbation    Andrés Franco is a 24 y.o. male with past medical history of asthma who presents with worsening shortness of breath over the past 2 days.  Patient reports sinus congestion cough and subjective fever and chills over the past week.  For the last 2 days he has had increased shortness of breath and wheezing.  Has been using inhaler and nebulizer at home without improvement.  He called EMS today for shortness of breath.  They gave Solu-Medrol 125 IV and a DuoNeb en route.  Patient now 96% on room air, wheezing feeling improved.  Denies chest pain, vomiting, diarrhea, lower extremity edema, dizziness or syncope.    Nursing Notes were all reviewed and agreed with or any disagreements were addressed in the HPI.    REVIEW OF SYSTEMS :      Review of Systems   All other systems reviewed and are negative.      Positives and Pertinent negatives as per HPI.     SURGICAL HISTORY     Past Surgical History:   Procedure Laterality Date    KNEE SURGERY Left 2019       CURRENTMEDICATIONS       Previous Medications    ACETAMINOPHEN (AMINOFEN) 325 MG TABLET    Take 2